# Patient Record
Sex: FEMALE | Race: AMERICAN INDIAN OR ALASKA NATIVE | NOT HISPANIC OR LATINO
[De-identification: names, ages, dates, MRNs, and addresses within clinical notes are randomized per-mention and may not be internally consistent; named-entity substitution may affect disease eponyms.]

---

## 2017-03-20 ENCOUNTER — RECORD ABSTRACTING (OUTPATIENT)
Age: 37
End: 2017-03-20

## 2017-03-20 DIAGNOSIS — Z78.9 OTHER SPECIFIED HEALTH STATUS: ICD-10-CM

## 2017-03-20 DIAGNOSIS — Z86.39 PERSONAL HISTORY OF OTHER ENDOCRINE, NUTRITIONAL AND METABOLIC DISEASE: ICD-10-CM

## 2017-03-20 DIAGNOSIS — Z86.2 PERSONAL HISTORY OF DISEASES OF THE BLOOD AND BLOOD-FORMING ORGANS AND CERTAIN DISORDERS INVOLVING THE IMMUNE MECHANISM: ICD-10-CM

## 2017-03-21 ENCOUNTER — APPOINTMENT (OUTPATIENT)
Dept: INTERNAL MEDICINE | Facility: CLINIC | Age: 37
End: 2017-03-21

## 2017-03-21 VITALS — DIASTOLIC BLOOD PRESSURE: 87 MMHG | SYSTOLIC BLOOD PRESSURE: 124 MMHG | HEART RATE: 105 BPM

## 2017-03-21 VITALS — BODY MASS INDEX: 35.34 KG/M2 | WEIGHT: 180 LBS | HEIGHT: 60 IN

## 2017-03-21 RX ORDER — BUDESONIDE AND FORMOTEROL FUMARATE DIHYDRATE 160; 4.5 UG/1; UG/1
160-4.5 AEROSOL RESPIRATORY (INHALATION) TWICE DAILY
Qty: 1 | Refills: 3 | Status: COMPLETED | COMMUNITY

## 2017-04-18 LAB
ALBUMIN SERPL-MCNC: 3.9 G/DL
ALBUMIN/GLOB SERPL: 1.44
ALP SERPL-CCNC: 60 IU/L
ALT SERPL-CCNC: 32 IU/L
ANION GAP SERPL CALC-SCNC: 8 MEQ/L
AST SERPL-CCNC: 31 IU/L
BASOPHILS # BLD: 0.05 TH/MM3
BASOPHILS NFR BLD: 0.7 %
BILIRUB SERPL-MCNC: 0.4 MG/DL
BUN SERPL-MCNC: 9 MG/DL
BUN/CREAT SERPL: 13.2 %
CALCIUM SERPL-MCNC: 8.8 MG/DL
CHLORIDE SERPL-SCNC: 107 MEQ/L
CHOLEST SERPL-MCNC: 203 MG/DL
CO2 SERPL-SCNC: 23 MEQ/L
CREAT SERPL-MCNC: 0.68 MG/DL
EOSINOPHIL # BLD: 0.48 TH/MM3
EOSINOPHIL NFR BLD: 6.4 %
ERYTHROCYTE [DISTWIDTH] IN BLOOD BY AUTOMATED COUNT: 18.5 %
ESTIMATED AVERGAGE GLUCOSE (NORTH): 134 MG/DL
GFR SERPL CREATININE-BSD FRML MDRD: 97
GLUCOSE SERPL-MCNC: 93 MG/DL
GRANULOCYTES # BLD: 2.66 TH/MM3
GRANULOCYTES NFR BLD: 35.2 %
HBA1C MFR BLD: 6.3 %
HCT VFR BLD AUTO: 33.8 %
HDLC SERPL-MCNC: 52 MG/DL
HDLC SERPL: 3.9
HGB BLD-MCNC: 10.2 G/DL
IMM GRANULOCYTES # BLD: 0.01 TH/MM3
IMM GRANULOCYTES NFR BLD: 0.1 %
LDLC SERPL DIRECT ASSAY-MCNC: 140 MG/DL
LYMPHOCYTES # BLD: 3.54 TH/MM3
LYMPHOCYTES NFR BLD: 46.9 %
MCH RBC QN AUTO: 22.5 PG
MCHC RBC AUTO-ENTMCNC: 30.2 G/DL
MCV RBC AUTO: 74.4 FL
MONOCYTES # BLD: 0.81 TH/MM3
MONOCYTES NFR BLD: 10.7 %
PLATELET # BLD: 358 TH/MM3
PMV BLD AUTO: 12.2 FL
POTASSIUM SERPL-SCNC: 4.4 MMOL/L
PROT SERPL-MCNC: 6.6 G/DL
RBC # BLD AUTO: 4.54 MIL/MM3
SODIUM SERPL-SCNC: 138 MEQ/L
TRIGL SERPL-MCNC: 132 MG/DL
VLDLC SERPL-MCNC: 26 MG/DL
WBC # BLD: 7.55 TH/MM3

## 2017-04-25 ENCOUNTER — APPOINTMENT (OUTPATIENT)
Dept: INTERNAL MEDICINE | Facility: CLINIC | Age: 37
End: 2017-04-25

## 2017-04-25 VITALS
SYSTOLIC BLOOD PRESSURE: 108 MMHG | WEIGHT: 179 LBS | BODY MASS INDEX: 32.94 KG/M2 | HEART RATE: 85 BPM | DIASTOLIC BLOOD PRESSURE: 75 MMHG | HEIGHT: 62 IN

## 2017-05-09 ENCOUNTER — APPOINTMENT (OUTPATIENT)
Dept: OBGYN | Facility: CLINIC | Age: 37
End: 2017-05-09

## 2017-05-09 ENCOUNTER — RESULT CHARGE (OUTPATIENT)
Age: 37
End: 2017-05-09

## 2017-05-09 ENCOUNTER — OUTPATIENT (OUTPATIENT)
Dept: OUTPATIENT SERVICES | Facility: HOSPITAL | Age: 37
LOS: 1 days | Discharge: HOME | End: 2017-05-09

## 2017-05-09 VITALS
BODY MASS INDEX: 35.08 KG/M2 | DIASTOLIC BLOOD PRESSURE: 62 MMHG | WEIGHT: 174 LBS | HEIGHT: 59 IN | SYSTOLIC BLOOD PRESSURE: 110 MMHG

## 2017-05-09 DIAGNOSIS — Z83.3 FAMILY HISTORY OF DIABETES MELLITUS: ICD-10-CM

## 2017-05-09 LAB — HCG UR QL: NEGATIVE

## 2017-05-09 RX ORDER — PREDNISONE 20 MG/1
20 TABLET ORAL DAILY
Qty: 5 | Refills: 0 | Status: COMPLETED | COMMUNITY
Start: 2017-03-21 | End: 2017-05-09

## 2017-05-10 ENCOUNTER — RESULT REVIEW (OUTPATIENT)
Age: 37
End: 2017-05-10

## 2017-05-19 LAB
HPV I/H RISK 1 DNA CVX QL PROBE+SIG AMP: NOT DETECTED
HPV LOW RISK DNA CVX QL PROBE+SIG AMP: NOT DETECTED

## 2017-05-23 ENCOUNTER — OUTPATIENT (OUTPATIENT)
Dept: OUTPATIENT SERVICES | Facility: HOSPITAL | Age: 37
LOS: 1 days | Discharge: HOME | End: 2017-05-23

## 2017-05-23 ENCOUNTER — APPOINTMENT (OUTPATIENT)
Dept: OBGYN | Facility: CLINIC | Age: 37
End: 2017-05-23

## 2017-06-28 DIAGNOSIS — N93.9 ABNORMAL UTERINE AND VAGINAL BLEEDING, UNSPECIFIED: ICD-10-CM

## 2017-07-03 ENCOUNTER — APPOINTMENT (OUTPATIENT)
Dept: OBGYN | Facility: CLINIC | Age: 37
End: 2017-07-03

## 2017-07-25 ENCOUNTER — APPOINTMENT (OUTPATIENT)
Dept: INTERNAL MEDICINE | Facility: CLINIC | Age: 37
End: 2017-07-25

## 2017-08-09 DIAGNOSIS — Z01.411 ENCOUNTER FOR GYNECOLOGICAL EXAMINATION (GENERAL) (ROUTINE) WITH ABNORMAL FINDINGS: ICD-10-CM

## 2017-08-09 DIAGNOSIS — N93.9 ABNORMAL UTERINE AND VAGINAL BLEEDING, UNSPECIFIED: ICD-10-CM

## 2017-08-23 ENCOUNTER — APPOINTMENT (OUTPATIENT)
Dept: OBGYN | Facility: CLINIC | Age: 37
End: 2017-08-23

## 2018-02-06 ENCOUNTER — APPOINTMENT (OUTPATIENT)
Dept: INTERNAL MEDICINE | Facility: CLINIC | Age: 38
End: 2018-02-06

## 2018-02-06 ENCOUNTER — OUTPATIENT (OUTPATIENT)
Dept: OUTPATIENT SERVICES | Facility: HOSPITAL | Age: 38
LOS: 1 days | Discharge: HOME | End: 2018-02-06

## 2018-02-06 VITALS
SYSTOLIC BLOOD PRESSURE: 119 MMHG | DIASTOLIC BLOOD PRESSURE: 78 MMHG | BODY MASS INDEX: 34.68 KG/M2 | HEIGHT: 59 IN | WEIGHT: 172 LBS | TEMPERATURE: 98.7 F | HEART RATE: 91 BPM

## 2018-02-06 RX ORDER — FEXOFENADINE HCL 180 MG
180 TABLET ORAL
Refills: 0 | Status: DISCONTINUED | COMMUNITY
End: 2018-02-06

## 2018-02-06 RX ORDER — FERROUS SULFATE 325(65) MG
325 (65 FE) TABLET ORAL TWICE DAILY
Qty: 60 | Refills: 2 | Status: DISCONTINUED | COMMUNITY
Start: 2017-04-25 | End: 2018-02-06

## 2018-02-06 RX ORDER — CHLORHEXIDINE GLUCONATE 4 %
325 (65 FE) LIQUID (ML) TOPICAL
Refills: 0 | Status: DISCONTINUED | COMMUNITY
End: 2018-02-06

## 2018-02-06 RX ORDER — ALBUTEROL SULFATE 90 UG/1
108 (90 BASE) AEROSOL, METERED RESPIRATORY (INHALATION)
Qty: 1 | Refills: 3 | Status: DISCONTINUED | COMMUNITY
Start: 2017-03-21 | End: 2018-02-06

## 2018-02-06 RX ORDER — NORETHINDRONE AND ETHINYL ESTRADIOL 1 MG-35MCG
1-35 KIT ORAL
Qty: 28 | Refills: 2 | Status: DISCONTINUED | COMMUNITY
Start: 2017-05-23 | End: 2018-02-06

## 2018-02-06 RX ORDER — MONTELUKAST 10 MG/1
10 TABLET, FILM COATED ORAL
Qty: 30 | Refills: 1 | Status: DISCONTINUED | COMMUNITY
End: 2018-02-06

## 2018-02-07 DIAGNOSIS — E66.9 OBESITY, UNSPECIFIED: ICD-10-CM

## 2018-02-07 DIAGNOSIS — Z02.1 ENCOUNTER FOR PRE-EMPLOYMENT EXAMINATION: ICD-10-CM

## 2018-02-07 DIAGNOSIS — J45.909 UNSPECIFIED ASTHMA, UNCOMPLICATED: ICD-10-CM

## 2018-02-07 DIAGNOSIS — R73.03 PREDIABETES: ICD-10-CM

## 2018-02-07 DIAGNOSIS — Z23 ENCOUNTER FOR IMMUNIZATION: ICD-10-CM

## 2018-02-15 ENCOUNTER — LABORATORY RESULT (OUTPATIENT)
Age: 38
End: 2018-02-15

## 2018-02-20 ENCOUNTER — APPOINTMENT (OUTPATIENT)
Dept: INTERNAL MEDICINE | Facility: CLINIC | Age: 38
End: 2018-02-20

## 2018-02-20 ENCOUNTER — OUTPATIENT (OUTPATIENT)
Dept: OUTPATIENT SERVICES | Facility: HOSPITAL | Age: 38
LOS: 1 days | Discharge: HOME | End: 2018-02-20

## 2018-02-20 VITALS
BODY MASS INDEX: 34.47 KG/M2 | DIASTOLIC BLOOD PRESSURE: 76 MMHG | SYSTOLIC BLOOD PRESSURE: 116 MMHG | HEIGHT: 59 IN | WEIGHT: 171 LBS | HEART RATE: 91 BPM

## 2018-02-20 DIAGNOSIS — Z23 ENCOUNTER FOR IMMUNIZATION: ICD-10-CM

## 2018-02-20 DIAGNOSIS — Z02.1 ENCOUNTER FOR PRE-EMPLOYMENT EXAMINATION: ICD-10-CM

## 2018-02-20 LAB
ADJUSTED MITOGEN: >10 IU/ML
ADJUSTED TB AG: 0.02 IU/ML
ALBUMIN SERPL ELPH-MCNC: 3.9 G/DL
ALP BLD-CCNC: 48 U/L
ALT SERPL-CCNC: 24 U/L
ANION GAP SERPL CALC-SCNC: 10 MMOL/L
AST SERPL-CCNC: 27 U/L
BILIRUB SERPL-MCNC: 0.6 MG/DL
BUN SERPL-MCNC: 6 MG/DL
CALCIUM SERPL-MCNC: 8.7 MG/DL
CHLORIDE SERPL-SCNC: 106 MMOL/L
CHOLEST SERPL-MCNC: 151 MG/DL
CHOLEST/HDLC SERPL: 4.7 RATIO
CO2 SERPL-SCNC: 22 MMOL/L
CREAT SERPL-MCNC: 0.6 MG/DL
GLUCOSE SERPL-MCNC: 93 MG/DL
HBV SURFACE AB SER QL: NONREACTIVE
HDLC SERPL-MCNC: 32 MG/DL
LDLC SERPL CALC-MCNC: 107 MG/DL
M TB IFN-G BLD-IMP: NEGATIVE
MEV IGG FLD QL IA: >300 AU/ML
MEV IGG+IGM SER-IMP: POSITIVE
MUV AB SER-ACNC: POSITIVE
MUV IGG SER QL IA: 86.6 AU/ML
POTASSIUM SERPL-SCNC: 4.5 MMOL/L
PROT SERPL-MCNC: 6.7 G/DL
QUANTIFERON GOLD NIL: 0.04 IU/ML
RUBV IGG FLD-ACNC: 12.4 INDEX
RUBV IGG SER-IMP: POSITIVE
SODIUM SERPL-SCNC: 138 MMOL/L
TRIGL SERPL-MCNC: 133 MG/DL
VZV AB TITR SER: POSITIVE
VZV IGG SER IF-ACNC: 720.2 INDEX

## 2018-02-20 RX ORDER — ALBUTEROL 90 MCG
AEROSOL (GRAM) INHALATION
Refills: 0 | Status: DISCONTINUED | COMMUNITY
End: 2018-02-20

## 2018-03-27 ENCOUNTER — OUTPATIENT (OUTPATIENT)
Dept: OUTPATIENT SERVICES | Facility: HOSPITAL | Age: 38
LOS: 1 days | Discharge: HOME | End: 2018-03-27

## 2018-03-27 ENCOUNTER — APPOINTMENT (OUTPATIENT)
Dept: INTERNAL MEDICINE | Facility: CLINIC | Age: 38
End: 2018-03-27

## 2018-03-27 VITALS
BODY MASS INDEX: 33.87 KG/M2 | WEIGHT: 168 LBS | SYSTOLIC BLOOD PRESSURE: 122 MMHG | HEIGHT: 59 IN | DIASTOLIC BLOOD PRESSURE: 86 MMHG | HEART RATE: 89 BPM

## 2018-03-27 DIAGNOSIS — E66.9 OBESITY, UNSPECIFIED: ICD-10-CM

## 2018-03-27 DIAGNOSIS — B19.10 UNSPECIFIED VIRAL HEPATITIS B W/OUT HEPATIC COMA: ICD-10-CM

## 2018-07-24 ENCOUNTER — APPOINTMENT (OUTPATIENT)
Dept: INTERNAL MEDICINE | Facility: CLINIC | Age: 38
End: 2018-07-24

## 2018-08-28 ENCOUNTER — APPOINTMENT (OUTPATIENT)
Dept: INTERNAL MEDICINE | Facility: CLINIC | Age: 38
End: 2018-08-28

## 2018-09-22 ENCOUNTER — RX RENEWAL (OUTPATIENT)
Age: 38
End: 2018-09-22

## 2019-02-01 ENCOUNTER — OUTPATIENT (OUTPATIENT)
Dept: OUTPATIENT SERVICES | Facility: HOSPITAL | Age: 39
LOS: 1 days | End: 2019-02-01
Payer: MEDICAID

## 2019-02-01 PROCEDURE — G9001: CPT

## 2019-02-15 ENCOUNTER — OUTPATIENT (OUTPATIENT)
Dept: OUTPATIENT SERVICES | Facility: HOSPITAL | Age: 39
LOS: 1 days | Discharge: HOME | End: 2019-02-15

## 2019-02-15 ENCOUNTER — APPOINTMENT (OUTPATIENT)
Dept: INTERNAL MEDICINE | Facility: CLINIC | Age: 39
End: 2019-02-15

## 2019-02-15 VITALS
HEIGHT: 59 IN | TEMPERATURE: 98.3 F | HEART RATE: 89 BPM | WEIGHT: 170 LBS | DIASTOLIC BLOOD PRESSURE: 82 MMHG | SYSTOLIC BLOOD PRESSURE: 123 MMHG | BODY MASS INDEX: 34.27 KG/M2

## 2019-02-15 NOTE — PLAN
[FreeTextEntry1] : #Elevated HGA1c\par - recommended diet and weight control\par - dropped from previous 6.3 to 5.9 \par \par #Iron Def anemia\par - CBC ordered and followup \par \par #HCM \par - Pap smear needed \par - Follow up OB/Gyn \par

## 2019-02-15 NOTE — HISTORY OF PRESENT ILLNESS
[FreeTextEntry1] : Follow up  [de-identified] : 38 yr female PMHx of asthma microcystic anemia, menorrhgia, asthma. Presents for follow up blood work and work paperwork. On blood work pt found to have elevated HGA1c. No CBC was done with last blood work. Pt was given OCP's six months since last visit to Gyn physician and was lost to follow-up. Currently asymmptomatic, no blurry vision or heaches.\par

## 2019-02-15 NOTE — PHYSICAL EXAM
[No Acute Distress] : no acute distress [Well Nourished] : well nourished [Well Developed] : well developed [Well-Appearing] : well-appearing [PERRL] : pupils equal round and reactive to light [Normal Outer Ear/Nose] : the outer ears and nose were normal in appearance [Normal Oropharynx] : the oropharynx was normal [No JVD] : no jugular venous distention [No Lymphadenopathy] : no lymphadenopathy [No Respiratory Distress] : no respiratory distress  [Clear to Auscultation] : lungs were clear to auscultation bilaterally [No Accessory Muscle Use] : no accessory muscle use [Normal Rate] : normal rate  [Regular Rhythm] : with a regular rhythm [Normal S1, S2] : normal S1 and S2 [Normal Posterior Cervical Nodes] : no posterior cervical lymphadenopathy [Normal Anterior Cervical Nodes] : no anterior cervical lymphadenopathy [No CVA Tenderness] : no CVA  tenderness [No Joint Swelling] : no joint swelling [Grossly Normal Strength/Tone] : grossly normal strength/tone [Normal Gait] : normal gait [Coordination Grossly Intact] : coordination grossly intact [No Focal Deficits] : no focal deficits

## 2019-02-15 NOTE — ASSESSMENT
[FreeTextEntry1] : 38 yr female PMHx of asthma microcystic anemia, menorrhgia, asthma. Presents for follow up blood work and work paperwork. On blood work pt found to have elevated HGA1c

## 2019-02-22 DIAGNOSIS — D50.9 IRON DEFICIENCY ANEMIA, UNSPECIFIED: ICD-10-CM

## 2019-02-22 DIAGNOSIS — Z71.89 OTHER SPECIFIED COUNSELING: ICD-10-CM

## 2019-02-22 DIAGNOSIS — J45.909 UNSPECIFIED ASTHMA, UNCOMPLICATED: ICD-10-CM

## 2019-03-30 ENCOUNTER — LABORATORY RESULT (OUTPATIENT)
Age: 39
End: 2019-03-30

## 2019-04-02 LAB
ANION GAP SERPL CALC-SCNC: 14 MMOL/L
BASOPHILS # BLD AUTO: 0.06 K/UL
BASOPHILS NFR BLD AUTO: 1 %
BUN SERPL-MCNC: 9 MG/DL
CALCIUM SERPL-MCNC: 9.5 MG/DL
CHLORIDE SERPL-SCNC: 106 MMOL/L
CHOLEST SERPL-MCNC: 166 MG/DL
CHOLEST/HDLC SERPL: 3.7 RATIO
CO2 SERPL-SCNC: 22 MMOL/L
CREAT SERPL-MCNC: 0.7 MG/DL
EOSINOPHIL # BLD AUTO: 0.17 K/UL
EOSINOPHIL NFR BLD AUTO: 2.8 %
GLUCOSE SERPL-MCNC: 97 MG/DL
HCT VFR BLD CALC: 29.7 %
HDLC SERPL-MCNC: 45 MG/DL
HGB BLD-MCNC: 8.2 G/DL
IMM GRANULOCYTES NFR BLD AUTO: 0.3 %
LDLC SERPL CALC-MCNC: 121 MG/DL
LYMPHOCYTES # BLD AUTO: 2.31 K/UL
LYMPHOCYTES NFR BLD AUTO: 37.9 %
MAN DIFF?: NORMAL
MCHC RBC-ENTMCNC: 18.2 PG
MCHC RBC-ENTMCNC: 27.6 G/DL
MCV RBC AUTO: 65.9 FL
MONOCYTES # BLD AUTO: 0.46 K/UL
MONOCYTES NFR BLD AUTO: 7.5 %
NEUTROPHILS # BLD AUTO: 3.08 K/UL
NEUTROPHILS NFR BLD AUTO: 50.5 %
PLATELET # BLD AUTO: 439 K/UL
POTASSIUM SERPL-SCNC: 4.8 MMOL/L
RBC # BLD: 4.51 M/UL
RBC # FLD: 20.7 %
SODIUM SERPL-SCNC: 142 MMOL/L
T3 SERPL-MCNC: 121 NG/DL
T3FREE SERPL-MCNC: 2.91 PG/ML
T4 FREE SERPL-MCNC: 1.1 NG/DL
TRIGL SERPL-MCNC: 101 MG/DL
TSH SERPL-ACNC: 1.09 UIU/ML
WBC # FLD AUTO: 6.1 K/UL

## 2019-04-04 LAB
M TB IFN-G BLD-IMP: NEGATIVE
QUANTIFERON TB PLUS MITOGEN MINUS NIL: >10 IU/ML
QUANTIFERON TB PLUS NIL: 0.02 IU/ML
QUANTIFERON TB PLUS TB1 MINUS NIL: 0.01 IU/ML
QUANTIFERON TB PLUS TB2 MINUS NIL: 0 IU/ML

## 2019-04-06 ENCOUNTER — LABORATORY RESULT (OUTPATIENT)
Age: 39
End: 2019-04-06

## 2019-05-22 ENCOUNTER — APPOINTMENT (OUTPATIENT)
Dept: OBGYN | Facility: CLINIC | Age: 39
End: 2019-05-22

## 2019-08-20 ENCOUNTER — APPOINTMENT (OUTPATIENT)
Dept: INTERNAL MEDICINE | Facility: CLINIC | Age: 39
End: 2019-08-20

## 2019-08-28 ENCOUNTER — APPOINTMENT (OUTPATIENT)
Dept: OBGYN | Facility: CLINIC | Age: 39
End: 2019-08-28

## 2019-10-16 ENCOUNTER — APPOINTMENT (OUTPATIENT)
Dept: OBGYN | Facility: CLINIC | Age: 39
End: 2019-10-16

## 2019-11-26 ENCOUNTER — APPOINTMENT (OUTPATIENT)
Dept: OBGYN | Facility: CLINIC | Age: 39
End: 2019-11-26
Payer: MEDICAID

## 2019-11-26 ENCOUNTER — OUTPATIENT (OUTPATIENT)
Dept: OUTPATIENT SERVICES | Facility: HOSPITAL | Age: 39
LOS: 1 days | Discharge: HOME | End: 2019-11-26

## 2019-11-26 VITALS
DIASTOLIC BLOOD PRESSURE: 88 MMHG | BODY MASS INDEX: 34.48 KG/M2 | SYSTOLIC BLOOD PRESSURE: 118 MMHG | HEIGHT: 59 IN | WEIGHT: 171.06 LBS

## 2019-11-26 DIAGNOSIS — Z01.419 ENCOUNTER FOR GYNECOLOGICAL EXAMINATION (GENERAL) (ROUTINE) W/OUT ABNORMAL FINDINGS: ICD-10-CM

## 2019-11-26 DIAGNOSIS — Z01.419 ENCOUNTER FOR GYNECOLOGICAL EXAMINATION (GENERAL) (ROUTINE) WITHOUT ABNORMAL FINDINGS: ICD-10-CM

## 2019-11-26 DIAGNOSIS — T83.32XA DISPLACEMENT OF INTRAUTERINE CONTRACEPTIVE DEVICE, INITIAL ENCOUNTER: ICD-10-CM

## 2019-11-26 DIAGNOSIS — N92.0 EXCESSIVE AND FREQUENT MENSTRUATION WITH REGULAR CYCLE: ICD-10-CM

## 2019-11-26 DIAGNOSIS — Z87.09 PERSONAL HISTORY OF OTHER DISEASES OF THE RESPIRATORY SYSTEM: ICD-10-CM

## 2019-11-26 DIAGNOSIS — N83.209 UNSPECIFIED OVARIAN CYST, UNSPECIFIED SIDE: ICD-10-CM

## 2019-11-26 PROCEDURE — ZZZZZ: CPT

## 2019-11-26 NOTE — HISTORY OF PRESENT ILLNESS
[Reproductive Age] : is of reproductive age [Last Pap ___] : Last cervical pap smear was [unfilled] [Menstrual Problems] : reports abnormal menses [Abnormal Duration ___ days] : the duration was abnormal lasting [unfilled] days [Excessive Bleeding] : bleeding has been excessive [Irregular Cycle Intervals] : are  irregular [Irregular Menses] : irregular menses [Prolonged Menses] : prolonged menses [Heavy Bleeding] : described as heavy in severity [Pain] : pelvic pain [Sexually Active] : is sexually active [Male ___] : [unfilled] male [Monogamous] : is monogamous [de-identified] : 5/2017 [Fever] : no fever [Vomiting] : no vomiting [Diarrhea] : no diarrhea [Nausea] : no nausea [Pelvic Pressure] : no pelvic pressure [Vaginal Bleeding] : no vaginal bleeding [Dysuria] : no dysuria [Palpitations] : no palpitations [Dizziness] : no dizziness [Menopausal Symptoms] : no manopausal symptoms [Pregnancy] : no pregnancy [FreeTextEntry8] : x 5-6 yrs

## 2019-11-26 NOTE — PHYSICAL EXAM
[Alert] : alert [Awake] : awake [Soft] : soft [Oriented x3] : oriented to person, place, and time [Normal] : uterus [Labia Majora] : labia major [Labia Minora] : labia minora [No Bleeding] : there was no active vaginal bleeding [IUD String] : had an IUD string protruding out [Normal Position] : in a normal position [Uterine Adnexae] : were not tender and not enlarged [Acute Distress] : no acute distress [Nipple Discharge] : no nipple discharge [Mass] : no breast mass [Axillary LAD] : no axillary lymphadenopathy [Tender] : non tender [Discharge] : had no discharge [Motion Tenderness] : there was no cervical motion tenderness [Pap Obtained] : a Pap smear was not performed [Enlarged ___ wks] : not enlarged [Adnexa Tenderness] : were not tender [Tenderness] : nontender [Ovarian Mass (___ Cm)] : there were no adnexal masses

## 2019-11-30 ENCOUNTER — OUTPATIENT (OUTPATIENT)
Dept: OUTPATIENT SERVICES | Facility: HOSPITAL | Age: 39
LOS: 1 days | Discharge: HOME | End: 2019-11-30
Payer: MEDICAID

## 2019-11-30 DIAGNOSIS — Z12.31 ENCOUNTER FOR SCREENING MAMMOGRAM FOR MALIGNANT NEOPLASM OF BREAST: ICD-10-CM

## 2019-11-30 PROCEDURE — 77067 SCR MAMMO BI INCL CAD: CPT | Mod: 26

## 2019-11-30 PROCEDURE — 77063 BREAST TOMOSYNTHESIS BI: CPT | Mod: 26

## 2019-12-18 ENCOUNTER — ASOB RESULT (OUTPATIENT)
Age: 39
End: 2019-12-18

## 2019-12-18 ENCOUNTER — APPOINTMENT (OUTPATIENT)
Dept: ANTEPARTUM | Facility: CLINIC | Age: 39
End: 2019-12-18
Payer: MEDICAID

## 2019-12-18 PROCEDURE — 76830 TRANSVAGINAL US NON-OB: CPT | Mod: 26

## 2019-12-24 ENCOUNTER — OUTPATIENT (OUTPATIENT)
Dept: OUTPATIENT SERVICES | Facility: HOSPITAL | Age: 39
LOS: 1 days | Discharge: HOME | End: 2019-12-24

## 2019-12-24 ENCOUNTER — RESULT CHARGE (OUTPATIENT)
Age: 39
End: 2019-12-24

## 2019-12-24 ENCOUNTER — APPOINTMENT (OUTPATIENT)
Dept: OBGYN | Facility: CLINIC | Age: 39
End: 2019-12-24
Payer: MEDICAID

## 2019-12-24 ENCOUNTER — LABORATORY RESULT (OUTPATIENT)
Age: 39
End: 2019-12-24

## 2019-12-24 DIAGNOSIS — T83.32XA DISPLACEMENT OF INTRAUTERINE CONTRACEPTIVE DEVICE, INITIAL ENCOUNTER: ICD-10-CM

## 2019-12-24 DIAGNOSIS — N93.9 ABNORMAL UTERINE AND VAGINAL BLEEDING, UNSPECIFIED: ICD-10-CM

## 2019-12-24 PROCEDURE — 99213 OFFICE O/P EST LOW 20 MIN: CPT | Mod: 25

## 2019-12-24 PROCEDURE — 58301 REMOVE INTRAUTERINE DEVICE: CPT

## 2019-12-24 NOTE — END OF VISIT
[] : Resident [Resident] : Resident [FreeTextEntry3] : Patient with persistent AUB and obesity, with paragard IUD in place but seen on sonogram within cervical os. Abnormal bleeding could be due to paragard. Paragard removed and EMB performed. Will place Mirena IUD after pathology from biopsy results.

## 2019-12-24 NOTE — PHYSICAL EXAM
[Labia Minora] : labia minora [Labia Majora] : labia major [Normal] : clitoris [No Bleeding] : there was no active vaginal bleeding [Anteversion] : anteverted [Enlarged ___ wks] : enlarged [unfilled] ~Uweeks [Uterine Adnexae] : were not tender and not enlarged

## 2019-12-24 NOTE — PROCEDURE
[Endometrial Biopsy] : Endometrial biopsy [Irregular Bleeding] : irregular uterine bleeding [Uterine Perforation] : uterine perforation [Neg Pregnancy Test] : a pregnancy test was negative [CONSENT OBTAINED] : written consent was obtained prior to the procedure. [LMP ___] : LMP was [unfilled] [No Premedication] : No premedication [None] : none [Anteverted] : anteverted [Pipelle] : a Pipelle endometrial suction curette [Abundant] : an abundant [Sent to Histology] : the specimen was place in buffered formalin and sent for pathlogy [Paraguard] : Alban [IUD Removal] : IUD [ IUD] :  IUD [Risks] : risks [Patient] : patient [Benefits] : benefits [Alternatives] : alternatives [Speculum Placed] : a speculum was placed in the vagina [Strings Visualized] : the IUD strings were visualized [IUD Discarded] : discarded [IUD Removed - Forceps] : the strings were grasped with forceps and the IUD was removed [Heavy Vaginal Bleeding] : for heavy vaginal bleeding [No Complications] : none [Tolerated Well] : the patient tolerated the procedure well [Pelvic Pain] : for pelvic pain [de-identified] : malposition

## 2019-12-26 DIAGNOSIS — N93.9 ABNORMAL UTERINE AND VAGINAL BLEEDING, UNSPECIFIED: ICD-10-CM

## 2019-12-26 DIAGNOSIS — T83.32XA DISPLACEMENT OF INTRAUTERINE CONTRACEPTIVE DEVICE, INITIAL ENCOUNTER: ICD-10-CM

## 2019-12-26 LAB — HCG UR QL: NEGATIVE

## 2019-12-30 ENCOUNTER — RESULT REVIEW (OUTPATIENT)
Age: 39
End: 2019-12-30

## 2020-01-14 ENCOUNTER — APPOINTMENT (OUTPATIENT)
Dept: OBGYN | Facility: CLINIC | Age: 40
End: 2020-01-14

## 2020-03-10 ENCOUNTER — APPOINTMENT (OUTPATIENT)
Dept: OBGYN | Facility: CLINIC | Age: 40
End: 2020-03-10
Payer: MEDICAID

## 2020-03-10 ENCOUNTER — RESULT CHARGE (OUTPATIENT)
Age: 40
End: 2020-03-10

## 2020-03-10 ENCOUNTER — OUTPATIENT (OUTPATIENT)
Dept: OUTPATIENT SERVICES | Facility: HOSPITAL | Age: 40
LOS: 1 days | Discharge: HOME | End: 2020-03-10

## 2020-03-10 VITALS
DIASTOLIC BLOOD PRESSURE: 76 MMHG | BODY MASS INDEX: 33.26 KG/M2 | WEIGHT: 165 LBS | SYSTOLIC BLOOD PRESSURE: 112 MMHG | HEIGHT: 59 IN

## 2020-03-10 DIAGNOSIS — Z30.430 ENCOUNTER FOR INSERTION OF INTRAUTERINE CONTRACEPTIVE DEVICE: ICD-10-CM

## 2020-03-10 PROCEDURE — 99212 OFFICE O/P EST SF 10 MIN: CPT | Mod: 25

## 2020-03-10 PROCEDURE — 58300 INSERT INTRAUTERINE DEVICE: CPT

## 2020-03-10 NOTE — PROCEDURE
[IUD Placement] : intrauterine device (IUD) placement [Prevention of Pregnancy] : prevention of pregnancy [Risks] : risks [Benefits] : benefits [Patient] : patient [Infection] : infection [Bleeding] : bleeding [Pain] : pain [Expulsion] : expulsion [Failure] : failure [Uterine Perforation] : uterine perforation [CONSENT OBTAINED] : written consent was obtained prior to the procedure. [LMP ___] : LMP was [unfilled] [Neg Pregnancy Test] : a pregnancy test was negative [No Premedication] : No premedication [Betadine] : Prepped with Betadine [None] : none [Tenaculum] : a single toothed tenaculum [Easy Passage] : allowed easy passage of a uterine sound without dilation [Mirena IUD] : The Mirena IUD was inserted past the internal cervical os. The IUD was then gently inserted upwards toward the fundus.  The IUD strings were cut to an appropriate length. [Tolerated Well] : the patient tolerated the procedure well [No Complications] : there were no complications [Motrin/Ibuprofen] : Motrin/Ibuprofen

## 2020-03-10 NOTE — PHYSICAL EXAM
[No Lesions] : no genitalia lesions [Normal] : uterus [No Bleeding] : there was no active vaginal bleeding [Discharge] : had no discharge [Motion Tenderness] : there was no cervical motion tenderness [Normal Position] : in a normal position [Tenderness] : nontender [Uterine Adnexae] : were not tender and not enlarged [Adnexa Tenderness] : were not tender

## 2020-03-11 DIAGNOSIS — Z30.430 ENCOUNTER FOR INSERTION OF INTRAUTERINE CONTRACEPTIVE DEVICE: ICD-10-CM

## 2020-03-12 LAB
C TRACH RRNA SPEC QL NAA+PROBE: NOT DETECTED
HCG UR QL: NEGATIVE
N GONORRHOEA RRNA SPEC QL NAA+PROBE: NOT DETECTED
QUALITY CONTROL: YES
SOURCE AMPLIFICATION: NORMAL

## 2020-04-07 ENCOUNTER — APPOINTMENT (OUTPATIENT)
Dept: OBGYN | Facility: CLINIC | Age: 40
End: 2020-04-07

## 2020-05-12 ENCOUNTER — APPOINTMENT (OUTPATIENT)
Dept: INTERNAL MEDICINE | Facility: CLINIC | Age: 40
End: 2020-05-12

## 2020-05-12 ENCOUNTER — OUTPATIENT (OUTPATIENT)
Dept: OUTPATIENT SERVICES | Facility: HOSPITAL | Age: 40
LOS: 1 days | Discharge: HOME | End: 2020-05-12

## 2020-05-12 ENCOUNTER — APPOINTMENT (OUTPATIENT)
Dept: INTERNAL MEDICINE | Facility: CLINIC | Age: 40
End: 2020-05-12
Payer: MEDICAID

## 2020-05-12 DIAGNOSIS — D50.9 IRON DEFICIENCY ANEMIA, UNSPECIFIED: ICD-10-CM

## 2020-05-12 DIAGNOSIS — R73.9 HYPERGLYCEMIA, UNSPECIFIED: ICD-10-CM

## 2020-05-12 DIAGNOSIS — E78.5 HYPERLIPIDEMIA, UNSPECIFIED: ICD-10-CM

## 2020-05-12 PROCEDURE — 99212 OFFICE O/P EST SF 10 MIN: CPT | Mod: GC,95

## 2020-05-12 NOTE — ASSESSMENT
[FreeTextEntry1] : #Elevated HGA1c\par - claims good diet compliance\par \par #Iron Def anemia\par - reordered labs\par \par f/u 3 months in person \par

## 2020-05-12 NOTE — HISTORY OF PRESENT ILLNESS
[Medical Office: ([facility])___] : at the medical office located in  [Home] : at home, [unfilled] , at the time of the visit. [Patient] : the patient [Other:____] : [unfilled] [Self] : self [FreeTextEntry1] : refills [de-identified] : p/w needs refills for inhalers\par denies SOB or cough\par

## 2020-06-24 ENCOUNTER — APPOINTMENT (OUTPATIENT)
Dept: OBGYN | Facility: CLINIC | Age: 40
End: 2020-06-24
Payer: MEDICAID

## 2020-06-24 ENCOUNTER — OUTPATIENT (OUTPATIENT)
Dept: OUTPATIENT SERVICES | Facility: HOSPITAL | Age: 40
LOS: 1 days | Discharge: HOME | End: 2020-06-24

## 2020-06-24 VITALS
SYSTOLIC BLOOD PRESSURE: 114 MMHG | HEIGHT: 59 IN | BODY MASS INDEX: 33.87 KG/M2 | WEIGHT: 168 LBS | DIASTOLIC BLOOD PRESSURE: 78 MMHG

## 2020-06-24 DIAGNOSIS — R10.2 PELVIC AND PERINEAL PAIN: ICD-10-CM

## 2020-06-24 DIAGNOSIS — Z97.5 PRESENCE OF (INTRAUTERINE) CONTRACEPTIVE DEVICE: ICD-10-CM

## 2020-06-24 DIAGNOSIS — N92.6 IRREGULAR MENSTRUATION, UNSPECIFIED: ICD-10-CM

## 2020-06-24 PROCEDURE — 99213 OFFICE O/P EST LOW 20 MIN: CPT

## 2020-06-24 NOTE — PHYSICAL EXAM
[Labia Majora] : labia major [Labia Minora] : labia minora [Normal] : clitoris [Discharge] : had a ~M discharge [IUD String] : had an IUD string protruding out [Soft] :  the cervix was soft [Erosion] : had no erosions [Barrel-Shaped] : was not barrel-shaped [Polyp ___ cm] : had no polyp [Pap Obtained] : a Pap smear was not performed [Motion Tenderness] : there was no cervical motion tenderness [Dilated] : the cervix was not dilated [FreeTextEntry5] : Aptima swab obtained

## 2021-06-11 ENCOUNTER — LABORATORY RESULT (OUTPATIENT)
Age: 41
End: 2021-06-11

## 2023-12-04 ENCOUNTER — APPOINTMENT (OUTPATIENT)
Dept: INTERNAL MEDICINE | Facility: CLINIC | Age: 43
End: 2023-12-04
Payer: MEDICAID

## 2023-12-04 ENCOUNTER — OUTPATIENT (OUTPATIENT)
Dept: OUTPATIENT SERVICES | Facility: HOSPITAL | Age: 43
LOS: 1 days | End: 2023-12-04
Payer: MEDICAID

## 2023-12-04 VITALS
HEART RATE: 77 BPM | WEIGHT: 171 LBS | DIASTOLIC BLOOD PRESSURE: 89 MMHG | OXYGEN SATURATION: 96 % | TEMPERATURE: 98 F | BODY MASS INDEX: 34.47 KG/M2 | HEIGHT: 59 IN | SYSTOLIC BLOOD PRESSURE: 134 MMHG

## 2023-12-04 DIAGNOSIS — Z23 ENCOUNTER FOR IMMUNIZATION: ICD-10-CM

## 2023-12-04 DIAGNOSIS — Z00.00 ENCOUNTER FOR GENERAL ADULT MEDICAL EXAMINATION W/OUT ABNORMAL FINDINGS: ICD-10-CM

## 2023-12-04 DIAGNOSIS — Z00.00 ENCOUNTER FOR GENERAL ADULT MEDICAL EXAMINATION WITHOUT ABNORMAL FINDINGS: ICD-10-CM

## 2023-12-04 PROCEDURE — 90471 IMMUNIZATION ADMIN: CPT | Mod: 25

## 2023-12-04 PROCEDURE — 99204 OFFICE O/P NEW MOD 45 MIN: CPT

## 2023-12-04 PROCEDURE — 90686 IIV4 VACC NO PRSV 0.5 ML IM: CPT

## 2023-12-04 RX ORDER — ALBUTEROL SULFATE 90 UG/1
108 (90 BASE) AEROSOL, METERED RESPIRATORY (INHALATION) 3 TIMES DAILY
Qty: 1 | Refills: 1 | Status: COMPLETED | COMMUNITY
Start: 2018-02-06 | End: 2023-12-04

## 2023-12-04 RX ORDER — BUDESONIDE AND FORMOTEROL FUMARATE DIHYDRATE 160; 4.5 UG/1; UG/1
160-4.5 AEROSOL RESPIRATORY (INHALATION) TWICE DAILY
Qty: 1 | Refills: 1 | Status: COMPLETED | COMMUNITY
Start: 2017-03-21 | End: 2023-12-04

## 2023-12-05 DIAGNOSIS — Z23 ENCOUNTER FOR IMMUNIZATION: ICD-10-CM

## 2023-12-05 DIAGNOSIS — Z00.00 ENCOUNTER FOR GENERAL ADULT MEDICAL EXAMINATION WITHOUT ABNORMAL FINDINGS: ICD-10-CM

## 2023-12-05 DIAGNOSIS — J45.909 UNSPECIFIED ASTHMA, UNCOMPLICATED: ICD-10-CM

## 2023-12-15 ENCOUNTER — OUTPATIENT (OUTPATIENT)
Dept: OUTPATIENT SERVICES | Facility: HOSPITAL | Age: 43
LOS: 1 days | End: 2023-12-15
Payer: MEDICAID

## 2023-12-15 DIAGNOSIS — Z00.00 ENCOUNTER FOR GENERAL ADULT MEDICAL EXAMINATION WITHOUT ABNORMAL FINDINGS: ICD-10-CM

## 2023-12-15 PROCEDURE — 84443 ASSAY THYROID STIM HORMONE: CPT

## 2023-12-15 PROCEDURE — 85027 COMPLETE CBC AUTOMATED: CPT

## 2023-12-15 PROCEDURE — 83036 HEMOGLOBIN GLYCOSYLATED A1C: CPT

## 2023-12-15 PROCEDURE — 36415 COLL VENOUS BLD VENIPUNCTURE: CPT

## 2023-12-15 PROCEDURE — 80053 COMPREHEN METABOLIC PANEL: CPT

## 2023-12-15 PROCEDURE — 80061 LIPID PANEL: CPT

## 2023-12-16 DIAGNOSIS — Z00.00 ENCOUNTER FOR GENERAL ADULT MEDICAL EXAMINATION WITHOUT ABNORMAL FINDINGS: ICD-10-CM

## 2023-12-18 LAB
ALBUMIN SERPL ELPH-MCNC: 4.9 G/DL
ALP BLD-CCNC: 66 U/L
ALT SERPL-CCNC: 43 U/L
ANION GAP SERPL CALC-SCNC: 13 MMOL/L
AST SERPL-CCNC: 31 U/L
BASOPHILS # BLD AUTO: 0.06 K/UL
BASOPHILS NFR BLD AUTO: 0.9 %
BILIRUB SERPL-MCNC: 0.6 MG/DL
BUN SERPL-MCNC: 12 MG/DL
CALCIUM SERPL-MCNC: 10.4 MG/DL
CHLORIDE SERPL-SCNC: 106 MMOL/L
CHOLEST SERPL-MCNC: 185 MG/DL
CO2 SERPL-SCNC: 22 MMOL/L
CREAT SERPL-MCNC: 0.7 MG/DL
EGFR: 109 ML/MIN/1.73M2
EOSINOPHIL # BLD AUTO: 0.26 K/UL
EOSINOPHIL NFR BLD AUTO: 4 %
ESTIMATED AVERAGE GLUCOSE: 186 MG/DL
GLUCOSE SERPL-MCNC: 103 MG/DL
HBA1C MFR BLD HPLC: 8.1 %
HCT VFR BLD CALC: 43 %
HDLC SERPL-MCNC: 38 MG/DL
HGB BLD-MCNC: 14.3 G/DL
IMM GRANULOCYTES NFR BLD AUTO: 0.2 %
LDLC SERPL CALC-MCNC: 126 MG/DL
LYMPHOCYTES # BLD AUTO: 2.77 K/UL
LYMPHOCYTES NFR BLD AUTO: 42.2 %
MAN DIFF?: NORMAL
MCHC RBC-ENTMCNC: 29.8 PG
MCHC RBC-ENTMCNC: 33.3 G/DL
MCV RBC AUTO: 89.6 FL
MONOCYTES # BLD AUTO: 0.49 K/UL
MONOCYTES NFR BLD AUTO: 7.5 %
NEUTROPHILS # BLD AUTO: 2.97 K/UL
NEUTROPHILS NFR BLD AUTO: 45.2 %
NONHDLC SERPL-MCNC: 147 MG/DL
PLATELET # BLD AUTO: 270 K/UL
POTASSIUM SERPL-SCNC: 4.9 MMOL/L
PROT SERPL-MCNC: 7.3 G/DL
RBC # BLD: 4.8 M/UL
RBC # FLD: 12.5 %
SODIUM SERPL-SCNC: 141 MMOL/L
TRIGL SERPL-MCNC: 104 MG/DL
TSH SERPL-ACNC: 0.65 UIU/ML
WBC # FLD AUTO: 6.56 K/UL

## 2023-12-27 ENCOUNTER — OUTPATIENT (OUTPATIENT)
Dept: OUTPATIENT SERVICES | Facility: HOSPITAL | Age: 43
LOS: 1 days | End: 2023-12-27
Payer: MEDICAID

## 2023-12-27 ENCOUNTER — APPOINTMENT (OUTPATIENT)
Dept: INTERNAL MEDICINE | Facility: CLINIC | Age: 43
End: 2023-12-27
Payer: MEDICAID

## 2023-12-27 DIAGNOSIS — Z00.00 ENCOUNTER FOR GENERAL ADULT MEDICAL EXAMINATION WITHOUT ABNORMAL FINDINGS: ICD-10-CM

## 2023-12-27 DIAGNOSIS — E11.65 TYPE 2 DIABETES MELLITUS WITH HYPERGLYCEMIA: ICD-10-CM

## 2023-12-27 DIAGNOSIS — E78.5 HYPERLIPIDEMIA, UNSPECIFIED: ICD-10-CM

## 2023-12-27 DIAGNOSIS — J45.909 UNSPECIFIED ASTHMA, UNCOMPLICATED: ICD-10-CM

## 2023-12-27 PROCEDURE — ZZZZZ: CPT

## 2023-12-27 RX ORDER — ATORVASTATIN CALCIUM 20 MG/1
20 TABLET, FILM COATED ORAL
Qty: 30 | Refills: 3 | Status: ACTIVE | COMMUNITY
Start: 2023-12-27 | End: 1900-01-01

## 2023-12-27 NOTE — ASSESSMENT
[FreeTextEntry1] : 42 yo tele visit with her  follow up for lab results  The  and pt were on the phone and most of the history was relayed through the .  #) HLD:  WNL, cholesterol 185 WNL, HDL 38, , non- - ASCVD 2.6-5.4% - start atorvastatin 20   #) Uncontrolled DM: - AIC 8.1 - restart metformin 500 BID  #) Asthma: Hasn't used the inhaler in many months, hasn't had an asthma exacerbation in many years  HCM: mammogram - staff to give pt number to call to make appt pap smear - staff to give pt number to call to make appt never smoker f/u in 2 months with blood work

## 2023-12-27 NOTE — END OF VISIT
[] : Resident [FreeTextEntry3] : I was present with the Resident during the key portions of this encounter and provided supervision via audio technology.  I agree with the findings and plan as documented in the Resident's note, unless noted below.

## 2023-12-27 NOTE — HISTORY OF PRESENT ILLNESS
[Home] : at home, [unfilled] , at the time of the visit. [Medical Office: ([facility])___] : at the medical office located in  [Verbal consent obtained from patient] : the patient, [unfilled] [Spouse] : spouse [FreeTextEntry8] : CC follow up with labs

## 2024-01-02 DIAGNOSIS — E78.5 HYPERLIPIDEMIA, UNSPECIFIED: ICD-10-CM

## 2024-01-02 DIAGNOSIS — J45.909 UNSPECIFIED ASTHMA, UNCOMPLICATED: ICD-10-CM

## 2024-01-02 DIAGNOSIS — E11.65 TYPE 2 DIABETES MELLITUS WITH HYPERGLYCEMIA: ICD-10-CM

## 2024-02-26 ENCOUNTER — RX RENEWAL (OUTPATIENT)
Age: 44
End: 2024-02-26

## 2024-04-17 ENCOUNTER — RX RENEWAL (OUTPATIENT)
Age: 44
End: 2024-04-17

## 2024-04-17 RX ORDER — METFORMIN HYDROCHLORIDE 500 MG/1
500 TABLET, COATED ORAL TWICE DAILY
Qty: 60 | Refills: 5 | Status: ACTIVE | COMMUNITY
Start: 2023-12-27 | End: 1900-01-01

## 2024-05-03 ENCOUNTER — APPOINTMENT (OUTPATIENT)
Dept: PULMONOLOGY | Facility: CLINIC | Age: 44
End: 2024-05-03

## 2024-06-26 ENCOUNTER — RX RENEWAL (OUTPATIENT)
Age: 44
End: 2024-06-26

## 2024-06-26 RX ORDER — ALBUTEROL SULFATE 90 UG/1
108 (90 BASE) INHALANT RESPIRATORY (INHALATION) EVERY 4 HOURS
Qty: 6.7 | Refills: 3 | Status: ACTIVE | COMMUNITY
Start: 2023-12-04 | End: 1900-01-01

## 2024-11-26 ENCOUNTER — NON-APPOINTMENT (OUTPATIENT)
Age: 44
End: 2024-11-26

## 2024-11-26 ENCOUNTER — INPATIENT (INPATIENT)
Facility: HOSPITAL | Age: 44
LOS: 1 days | Discharge: LEFT BEFORE DISCHARGE | DRG: 141 | End: 2024-11-28
Attending: STUDENT IN AN ORGANIZED HEALTH CARE EDUCATION/TRAINING PROGRAM | Admitting: STUDENT IN AN ORGANIZED HEALTH CARE EDUCATION/TRAINING PROGRAM
Payer: MEDICAID

## 2024-11-26 VITALS
WEIGHT: 149.91 LBS | HEART RATE: 110 BPM | DIASTOLIC BLOOD PRESSURE: 68 MMHG | RESPIRATION RATE: 18 BRPM | TEMPERATURE: 100 F | OXYGEN SATURATION: 94 % | SYSTOLIC BLOOD PRESSURE: 123 MMHG

## 2024-11-26 LAB
ALBUMIN SERPL ELPH-MCNC: 4.7 G/DL — SIGNIFICANT CHANGE UP (ref 3.5–5.2)
ALP SERPL-CCNC: 61 U/L — SIGNIFICANT CHANGE UP (ref 30–115)
ALT FLD-CCNC: 34 U/L — SIGNIFICANT CHANGE UP (ref 0–41)
ANION GAP SERPL CALC-SCNC: 15 MMOL/L — HIGH (ref 7–14)
APTT BLD: 31.6 SEC — SIGNIFICANT CHANGE UP (ref 27–39.2)
AST SERPL-CCNC: 21 U/L — SIGNIFICANT CHANGE UP (ref 0–41)
BASOPHILS # BLD AUTO: 0 K/UL — SIGNIFICANT CHANGE UP (ref 0–0.2)
BASOPHILS NFR BLD AUTO: 0 % — SIGNIFICANT CHANGE UP (ref 0–1)
BILIRUB SERPL-MCNC: 0.9 MG/DL — SIGNIFICANT CHANGE UP (ref 0.2–1.2)
BUN SERPL-MCNC: 10 MG/DL — SIGNIFICANT CHANGE UP (ref 10–20)
CALCIUM SERPL-MCNC: 9.7 MG/DL — SIGNIFICANT CHANGE UP (ref 8.4–10.4)
CHLORIDE SERPL-SCNC: 101 MMOL/L — SIGNIFICANT CHANGE UP (ref 98–110)
CO2 SERPL-SCNC: 22 MMOL/L — SIGNIFICANT CHANGE UP (ref 17–32)
CREAT SERPL-MCNC: 0.7 MG/DL — SIGNIFICANT CHANGE UP (ref 0.7–1.5)
D DIMER BLD IA.RAPID-MCNC: <150 NG/ML DDU — SIGNIFICANT CHANGE UP
EGFR: 109 ML/MIN/1.73M2 — SIGNIFICANT CHANGE UP
EOSINOPHIL # BLD AUTO: 0 K/UL — SIGNIFICANT CHANGE UP (ref 0–0.7)
EOSINOPHIL NFR BLD AUTO: 0 % — SIGNIFICANT CHANGE UP (ref 0–8)
FLUAV AG NPH QL: SIGNIFICANT CHANGE UP
FLUBV AG NPH QL: SIGNIFICANT CHANGE UP
GAS PNL BLDV: SIGNIFICANT CHANGE UP
GLUCOSE SERPL-MCNC: 124 MG/DL — HIGH (ref 70–99)
HCG SERPL QL: NEGATIVE — SIGNIFICANT CHANGE UP
HCT VFR BLD CALC: 38.1 % — SIGNIFICANT CHANGE UP (ref 37–47)
HGB BLD-MCNC: 13.2 G/DL — SIGNIFICANT CHANGE UP (ref 12–16)
INR BLD: 1.21 RATIO — SIGNIFICANT CHANGE UP (ref 0.65–1.3)
LYMPHOCYTES # BLD AUTO: 0.71 K/UL — LOW (ref 1.2–3.4)
LYMPHOCYTES # BLD AUTO: 5.2 % — LOW (ref 20.5–51.1)
MCHC RBC-ENTMCNC: 29.8 PG — SIGNIFICANT CHANGE UP (ref 27–31)
MCHC RBC-ENTMCNC: 34.6 G/DL — SIGNIFICANT CHANGE UP (ref 32–37)
MCV RBC AUTO: 86 FL — SIGNIFICANT CHANGE UP (ref 81–99)
MONOCYTES # BLD AUTO: 0.83 K/UL — HIGH (ref 0.1–0.6)
MONOCYTES NFR BLD AUTO: 6.1 % — SIGNIFICANT CHANGE UP (ref 1.7–9.3)
NEUTROPHILS # BLD AUTO: 11.8 K/UL — HIGH (ref 1.4–6.5)
NEUTROPHILS NFR BLD AUTO: 81.7 % — HIGH (ref 42.2–75.2)
NT-PROBNP SERPL-SCNC: <36 PG/ML — SIGNIFICANT CHANGE UP (ref 0–300)
PLATELET # BLD AUTO: 270 K/UL — SIGNIFICANT CHANGE UP (ref 130–400)
PMV BLD: 10.9 FL — HIGH (ref 7.4–10.4)
POTASSIUM SERPL-MCNC: 3.4 MMOL/L — LOW (ref 3.5–5)
POTASSIUM SERPL-SCNC: 3.4 MMOL/L — LOW (ref 3.5–5)
PROT SERPL-MCNC: 7.2 G/DL — SIGNIFICANT CHANGE UP (ref 6–8)
PROTHROM AB SERPL-ACNC: 14.3 SEC — HIGH (ref 9.95–12.87)
RBC # BLD: 4.43 M/UL — SIGNIFICANT CHANGE UP (ref 4.2–5.4)
RBC # FLD: 12.8 % — SIGNIFICANT CHANGE UP (ref 11.5–14.5)
RSV RNA NPH QL NAA+NON-PROBE: SIGNIFICANT CHANGE UP
SARS-COV-2 RNA SPEC QL NAA+PROBE: SIGNIFICANT CHANGE UP
SODIUM SERPL-SCNC: 138 MMOL/L — SIGNIFICANT CHANGE UP (ref 135–146)
WBC # BLD: 13.58 K/UL — HIGH (ref 4.8–10.8)
WBC # FLD AUTO: 13.58 K/UL — HIGH (ref 4.8–10.8)

## 2024-11-26 PROCEDURE — 99285 EMERGENCY DEPT VISIT HI MDM: CPT

## 2024-11-26 PROCEDURE — 71045 X-RAY EXAM CHEST 1 VIEW: CPT | Mod: 26

## 2024-11-26 PROCEDURE — 93010 ELECTROCARDIOGRAM REPORT: CPT

## 2024-11-26 RX ORDER — METHYLPREDNISOLONE SOD SUCC 125 MG
125 VIAL (EA) INJECTION ONCE
Refills: 0 | Status: COMPLETED | OUTPATIENT
Start: 2024-11-26 | End: 2024-11-26

## 2024-11-26 RX ORDER — POTASSIUM CHLORIDE 600 MG/1
40 TABLET, EXTENDED RELEASE ORAL ONCE
Refills: 0 | Status: COMPLETED | OUTPATIENT
Start: 2024-11-26 | End: 2024-11-26

## 2024-11-26 RX ORDER — IPRATROPIUM BROMIDE AND ALBUTEROL SULFATE 2.5; .5 MG/3ML; MG/3ML
3 SOLUTION RESPIRATORY (INHALATION)
Refills: 0 | Status: COMPLETED | OUTPATIENT
Start: 2024-11-26 | End: 2024-11-26

## 2024-11-26 RX ADMIN — IPRATROPIUM BROMIDE AND ALBUTEROL SULFATE 3 MILLILITER(S): 2.5; .5 SOLUTION RESPIRATORY (INHALATION) at 22:46

## 2024-11-26 RX ADMIN — IPRATROPIUM BROMIDE AND ALBUTEROL SULFATE 3 MILLILITER(S): 2.5; .5 SOLUTION RESPIRATORY (INHALATION) at 22:43

## 2024-11-26 RX ADMIN — Medication 125 MILLIGRAM(S): at 23:51

## 2024-11-26 RX ADMIN — POTASSIUM CHLORIDE 40 MILLIEQUIVALENT(S): 600 TABLET, EXTENDED RELEASE ORAL at 23:33

## 2024-11-26 RX ADMIN — IPRATROPIUM BROMIDE AND ALBUTEROL SULFATE 3 MILLILITER(S): 2.5; .5 SOLUTION RESPIRATORY (INHALATION) at 22:45

## 2024-11-26 NOTE — ED ADULT TRIAGE NOTE - CHIEF COMPLAINT QUOTE
Pt presents to the ED c/o of SOB and fever. Tylenol 975 given at urgent care 15 minutes ago. (-) COVID (-) RSV. Pts  has COVID. Pt supposedly had 103F at urgent care

## 2024-11-27 ENCOUNTER — TRANSCRIPTION ENCOUNTER (OUTPATIENT)
Age: 44
End: 2024-11-27

## 2024-11-27 DIAGNOSIS — R50.9 FEVER, UNSPECIFIED: ICD-10-CM

## 2024-11-27 LAB
GAS PNL BLDA: SIGNIFICANT CHANGE UP
HMPV RNA SPEC QL NAA+PROBE: DETECTED
RAPID RVP RESULT: DETECTED
SARS-COV-2 RNA SPEC QL NAA+PROBE: SIGNIFICANT CHANGE UP

## 2024-11-27 PROCEDURE — 99223 1ST HOSP IP/OBS HIGH 75: CPT

## 2024-11-27 PROCEDURE — 80053 COMPREHEN METABOLIC PANEL: CPT

## 2024-11-27 PROCEDURE — 83605 ASSAY OF LACTIC ACID: CPT

## 2024-11-27 PROCEDURE — 85014 HEMATOCRIT: CPT

## 2024-11-27 PROCEDURE — 82330 ASSAY OF CALCIUM: CPT

## 2024-11-27 PROCEDURE — 82962 GLUCOSE BLOOD TEST: CPT

## 2024-11-27 PROCEDURE — 94640 AIRWAY INHALATION TREATMENT: CPT

## 2024-11-27 PROCEDURE — 84295 ASSAY OF SERUM SODIUM: CPT

## 2024-11-27 PROCEDURE — 71045 X-RAY EXAM CHEST 1 VIEW: CPT

## 2024-11-27 PROCEDURE — 83036 HEMOGLOBIN GLYCOSYLATED A1C: CPT

## 2024-11-27 PROCEDURE — 71275 CT ANGIOGRAPHY CHEST: CPT | Mod: 26

## 2024-11-27 PROCEDURE — 82803 BLOOD GASES ANY COMBINATION: CPT

## 2024-11-27 PROCEDURE — 0225U NFCT DS DNA&RNA 21 SARSCOV2: CPT

## 2024-11-27 PROCEDURE — 85018 HEMOGLOBIN: CPT

## 2024-11-27 PROCEDURE — 85025 COMPLETE CBC W/AUTO DIFF WBC: CPT

## 2024-11-27 PROCEDURE — 71045 X-RAY EXAM CHEST 1 VIEW: CPT | Mod: 26

## 2024-11-27 PROCEDURE — 71275 CT ANGIOGRAPHY CHEST: CPT | Mod: MC

## 2024-11-27 PROCEDURE — 84132 ASSAY OF SERUM POTASSIUM: CPT

## 2024-11-27 PROCEDURE — 36415 COLL VENOUS BLD VENIPUNCTURE: CPT

## 2024-11-27 RX ORDER — IPRATROPIUM BROMIDE AND ALBUTEROL SULFATE 2.5; .5 MG/3ML; MG/3ML
3 SOLUTION RESPIRATORY (INHALATION) EVERY 6 HOURS
Refills: 0 | Status: DISCONTINUED | OUTPATIENT
Start: 2024-11-27 | End: 2024-11-28

## 2024-11-27 RX ORDER — METHYLPREDNISOLONE SOD SUCC 125 MG
40 VIAL (EA) INJECTION EVERY 8 HOURS
Refills: 0 | Status: DISCONTINUED | OUTPATIENT
Start: 2024-11-27 | End: 2024-11-28

## 2024-11-27 RX ORDER — METHYLPREDNISOLONE SOD SUCC 125 MG
40 VIAL (EA) INJECTION EVERY 8 HOURS
Refills: 0 | Status: DISCONTINUED | OUTPATIENT
Start: 2024-11-27 | End: 2024-11-27

## 2024-11-27 RX ORDER — ENOXAPARIN SODIUM 30 MG/.3ML
40 INJECTION SUBCUTANEOUS EVERY 24 HOURS
Refills: 0 | Status: DISCONTINUED | OUTPATIENT
Start: 2024-11-27 | End: 2024-11-28

## 2024-11-27 RX ORDER — ORAL SEMAGLUTIDE 7 MG/1
0.5 TABLET ORAL
Refills: 0 | DISCHARGE

## 2024-11-27 RX ORDER — 0.9 % SODIUM CHLORIDE 0.9 %
1000 INTRAVENOUS SOLUTION INTRAVENOUS
Refills: 0 | Status: DISCONTINUED | OUTPATIENT
Start: 2024-11-27 | End: 2024-11-28

## 2024-11-27 RX ORDER — GLUCAGON INJECTION, SOLUTION 0.5 MG/.1ML
1 INJECTION, SOLUTION SUBCUTANEOUS ONCE
Refills: 0 | Status: DISCONTINUED | OUTPATIENT
Start: 2024-11-27 | End: 2024-11-28

## 2024-11-27 RX ADMIN — IPRATROPIUM BROMIDE AND ALBUTEROL SULFATE 3 MILLILITER(S): 2.5; .5 SOLUTION RESPIRATORY (INHALATION) at 08:45

## 2024-11-27 RX ADMIN — Medication 40 MILLIGRAM(S): at 14:39

## 2024-11-27 RX ADMIN — Medication 40 MILLIGRAM(S): at 21:16

## 2024-11-27 RX ADMIN — Medication 2: at 17:40

## 2024-11-27 RX ADMIN — IPRATROPIUM BROMIDE AND ALBUTEROL SULFATE 3 MILLILITER(S): 2.5; .5 SOLUTION RESPIRATORY (INHALATION) at 19:37

## 2024-11-27 RX ADMIN — IPRATROPIUM BROMIDE AND ALBUTEROL SULFATE 3 MILLILITER(S): 2.5; .5 SOLUTION RESPIRATORY (INHALATION) at 14:39

## 2024-11-27 RX ADMIN — Medication 4: at 08:46

## 2024-11-27 RX ADMIN — Medication 50 GRAM(S): at 00:25

## 2024-11-27 RX ADMIN — Medication 40 MILLIGRAM(S): at 06:58

## 2024-11-27 NOTE — H&P ADULT - NSHPPHYSICALEXAM_GEN_ALL_CORE
GENERAL: NAD, lying in bed comfortably  HEAD:  Atraumatic, normocephalic  EYES: EOMI, PERRL  NECK: Supple, trachea midline, no JVD  HEART: Regular rate and rhythm  LUNGS: Unlabored respirations.  Clear to auscultation bilaterally, no crackles, wheezing, or rhonchi  ABDOMEN: Soft, nontender, nondistended, +BS  EXTREMITIES: 2+ peripheral pulses bilaterally. No clubbing, cyanosis, or edema  NERVOUS SYSTEM:  A&Ox3, moving all extremities, no focal deficits GENERAL: NAD, lying in bed comfortably  HEAD:  Atraumatic, normocephalic  EYES: EOMI, PERRL  NECK: Supple, trachea midline, no JVD  HEART: Regular rate and rhythm  Throat: b/l erythema present, no whitish patch, tonsil not enlarged  LUNGS: Unlabored respirations. hu expiratory wheeze, no crepitations,   ABDOMEN: Soft, nontender, nondistended, +BS  EXTREMITIES: 2+ peripheral pulses bilaterally. No clubbing, cyanosis, or edema  NERVOUS SYSTEM:  A&Ox3, moving all extremities, no focal deficits

## 2024-11-27 NOTE — DISCHARGE NOTE PROVIDER - HOSPITAL COURSE
44-year-old female PMH asthma, diabetes (metformin, Ozempic) coming to ED for shortness of breath and fever.  Patient reports x 1 week has had intermittent shortness of breath, nonexertional, with associated fever Tmax 103F.  Has associated dry cough.  Reports family also has fever and cough,  tested positive for COVID this week.  Has been taking nebulized albuterol this week with some relief.  Denies recent travel, chest pain, abdominal pain, nausea vomiting diarrhea constipation, urinary symptoms, leg pain/swelling, hormone use.    ED vitals were:  BP: 123/68,  RR 18, Temp 99.5( 103 HIE), on 3L NC   Lab showed wbc 13.58, dimer negative, K 3.4 corrected, BNP 36 , lactate normal, RVP negative  Chest xray negative    Discussion of discharge plan of care, including discharge diagnoses, medication reconciliation, and follow-ups was conducted with Dr. Longoria on 11/27/2024, and discharge was approved.    # SIRS ( fever tachy leucocytosis) on admission  # acute exacerbation of asthma vs PE vs Pna vs acute bronchitis vs acute pharynitis  - fever and cough (on/off for 1 week), mostly dry cough, cough on deep inspiration   -  younger child got sick recently, then  was covid positive yesterday   - no recent diarrhoea, travel history, jaundice, arthralgia, joint tenderness,  normal bladder and bowel habits, does not use drugs  - able to speak sentences without difficulty, pharyngeal erythematous  - ED vitals were: BP: 123/68,  RR 18, Temp 99.5( 103 HIE), on 2L NC   - Lab: wbc 13.58, dimer negative, K 3.4 corrected, BNP 36 , lactate normal  - Chest xray negative  - ED T/t: methylprednisone, duonebs, mg/K supplement  - d/c o2 supplement , methylprednisone 40 TID, duoneb q6h standing   - COVID-19 negative  - human metapneumovirus positive  - ID consult   - F/u Bcx     # DM   - metformin and ozempic   - ISS, monitor BS     44-year-old female PMH asthma, diabetes (metformin, Ozempic) coming to ED for shortness of breath and fever.  Patient reports x 1 week has had intermittent shortness of breath, nonexertional, with associated fever Tmax 103F.  Has associated dry cough.  Reports family also has fever and cough,  tested positive for COVID this week.  Has been taking nebulized albuterol this week with some relief.  Denies recent travel, chest pain, abdominal pain, nausea vomiting diarrhea constipation, urinary symptoms, leg pain/swelling, hormone use.    ED vitals were:  BP: 123/68,  RR 18, Temp 99.5( 103 HIE), on 3L NC   Lab showed wbc 13.58, dimer negative, K 3.4 corrected, BNP 36 , lactate normal, RVP negative  Chest xray negative    Discussion of discharge plan of care, including discharge diagnoses, medication reconciliation, and follow-ups was conducted with Dr. Longoria on 11/27/2024, and discharge was approved.    # SIRS ( fever tachy leucocytosis) on admission  # acute exacerbation of asthma vs PE vs Pna vs acute bronchitis vs acute pharyngitics.     - fever and cough (on/off for 1 week), mostly dry cough, cough on deep inspiration   -  younger child got sick recently, then  was covid positive yesterday   - no recent diarrhoea, travel history, jaundice, arthralgia, joint tenderness,  normal bladder and bowel habits, does not use drugs  - able to speak sentences without difficulty, pharyngeal erythematous  - ED vitals were: BP: 123/68,  RR 18, Temp 99.5( 103 HIE), on 2L NC   - Lab: wbc 13.58, dimer negative, K 3.4 corrected, BNP 36 , lactate normal  - Chest xray negative  - ED T/t: methylprednisone, duonebs, mg/K supplement  - d/c o2 supplement , methylprednisone 40 TID, duoneb q6h standing   - COVID-19 negative  - human metapneumovirus positive  - ID consult   - F/u Bcx     # DM   - metformin and ozempic   - ISS, monitor BS    I informed this patient of the need for further medical evaluation and care given the patient's current medical condition.   This patient refused further medical evaluation and treatment and expressed a strong desire to leave the emergency department.  I informed this patient of the benefits of further medical evaluation and care at this facility.  I informed this patient of the risks of leaving against our medical advice without properly completing our evaluation today.  These risks included illness, injury, permanent disability and even death.  I informed the patient of the possible necessity for hospital admission depending on future findings.  The patient understood the risks and benefits of further medical treatment and the possible need for admission.  The patient continued to vehemently refuse further evaluation and treatment and continued to express a strong desire  to leave the emergency department.  At the time of discussion this patient maintained full faculties of judgement and medical decision making capacity (see below).    In accordance with this patient's wishes the patient was discharged from the emergency department against our medical advice.    The pt requested to be discharged today. Ambulatory sats were performed. At rest, O2 sat was 93% on 2LNC. On ambulation, O2 sat was 91-93% on 2LNC. The pt needs need oxygen, but the pt does not want to wait for further medical management and wishes to be discharged against medical advice.   Case discussed with Dr Luis HILL.

## 2024-11-27 NOTE — CONSULT NOTE ADULT - ASSESSMENT
ASSESSMENT  44-year-old female PMH asthma, diabetes (metformin, Ozempic) coming to ED for shortness of breath and fever.  Patient reports x 1 week has had intermittent shortness of breath, nonexertional, with associated fever Tmax 103F.  Has associated dry cough.  Reports family also has fever and cough,  tested positive for COVID this week.      IMPRESSION  #hMPV    CXR No radiographic evidence of acute cardiopulmonary disease.  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 All final recommendations to follow after interview and examination of the patient. Please follow recommendations noted below.  - Supportive care, monitor off antimicrobials     If any questions, please send a message or call on [employer] Teams  Please continue to update ID with any pertinent new laboratory, radiographic findings, or change in clinical status ASSESSMENT  44-year-old female PMH asthma, diabetes (metformin, Ozempic) coming to ED for shortness of breath and fever.  Patient reports x 1 week has had intermittent shortness of breath, nonexertional, with associated fever Tmax 103F.  Has associated dry cough.  Reports family also has fever and cough,  tested positive for COVID this week.      IMPRESSION  #hMPV    CXR No radiographic evidence of acute cardiopulmonary disease.  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 Please inform ID of any patient clinical change or any new pertinent laboratory or radiographic data     If any questions, please send a message or call on [email or web address] Teams  Please continue to update ID with any pertinent new laboratory, radiographic findings, or change in clinical status

## 2024-11-27 NOTE — DISCHARGE NOTE PROVIDER - TIME SPENT: (MINUTES SPENT ON THE DISCHARGE SERVICE)
Patient's wife was notified no specific preference, something similar to Centrum for Men would be fine. (per Dr Carpio)  
Pt's spouse calling and states that pt is out of the Multi vitamins. Spouse wanted to know what multi vitamins does  recommends..    
35

## 2024-11-27 NOTE — ED PROVIDER NOTE - DIFFERENTIAL DIAGNOSIS
Differential Diagnosis Electrolyte abnormalities, dehydration, MANASA, hyperglycemia, hypoglycemia, symptomatic anemia.  r/o pneumonia.

## 2024-11-27 NOTE — DISCHARGE NOTE PROVIDER - NSDCMRMEDTOKEN_GEN_ALL_CORE_FT
metFORMIN 500 mg oral tablet: 1 tab(s) orally 2 times a day  Ozempic 2 mg/1.5 mL (0.25 mg or 0.5 mg dose) subcutaneous solution: 0.5 milligram(s) subcutaneously once a week   ipratropium-albuterol 0.5 mg-2.5 mg/3 mL inhalation solution: 3 milliliter(s) inhaled every 6 hours  metFORMIN 500 mg oral tablet: 1 tab(s) orally 2 times a day  Ozempic 2 mg/1.5 mL (0.25 mg or 0.5 mg dose) subcutaneous solution: 0.5 milligram(s) subcutaneously once a week  predniSONE 20 mg oral tablet: 2 tab(s) orally once a day

## 2024-11-27 NOTE — H&P ADULT - NSHPLABSRESULTS_GEN_ALL_CORE
LABS:                          13.2   13.58 )-----------( 270      ( 26 Nov 2024 22:01 )             38.1     11-26    138  |  101  |  10  ----------------------------<  124[H]  3.4[L]   |  22  |  0.7    Ca    9.7      26 Nov 2024 22:01    TPro  7.2  /  Alb  4.7  /  TBili  0.9  /  DBili  x   /  AST  21  /  ALT  34  /  AlkPhos  61  11-26    LIVER FUNCTIONS - ( 26 Nov 2024 22:01 )  Alb: 4.7 g/dL / Pro: 7.2 g/dL / ALK PHOS: 61 U/L / ALT: 34 U/L / AST: 21 U/L / GGT: x           PT/INR - ( 26 Nov 2024 22:01 )   PT: 14.30 sec;   INR: 1.21 ratio         PTT - ( 26 Nov 2024 22:01 )  PTT:31.6 sec  Urinalysis Basic - ( 26 Nov 2024 22:01 )    Color: x / Appearance: x / SG: x / pH: x  Gluc: 124 mg/dL / Ketone: x  / Bili: x / Urobili: x   Blood: x / Protein: x / Nitrite: x   Leuk Esterase: x / RBC: x / WBC x   Sq Epi: x / Non Sq Epi: x / Bacteria: x

## 2024-11-27 NOTE — ED PROVIDER NOTE - EKG/XRAY ADDITIONAL INFORMATION
Sheath #all: Closed using manual compression and Hemostasis Pad. Site secured by Tegaderm. Pressure held for: 15 minutes. EKG shows sinus tachycardia, HR is108 and QRS is 86; no STEMI.   Chest X-rays reviewed and interpreted by me Dr. Goncalves and shows no actue findings. No Pneumothorax, no free air, no effusions, and these findings discussed with patient.

## 2024-11-27 NOTE — DISCHARGE NOTE PROVIDER - CARE PROVIDER_API CALL
Kimberlee Smith  Internal Medicine  [address] [place] [address]  Phone: [phone]  Fax: [phone]  Follow Up Time: 2 weeks

## 2024-11-27 NOTE — H&P ADULT - ASSESSMENT
44-year-old female PMH asthma, diabetes (metformin, Ozempic) coming to ED for shortness of breath and fever.  Patient reports x 1 week has had intermittent shortness of breath, nonexertional, with associated fever Tmax 103F.  Has associated dry cough.  Reports family also has fever and cough,  tested positive for COVID this week.  Has been taking nebulized albuterol this week with some relief.  Denies recent travel, chest pain, abdominal pain, nausea vomiting diarrhea constipation, urinary symptoms, leg pain/swelling, hormone use.        #Assessment and plan:    # SIRS ( fever tachy leucocytosis)  # acute exacerbation of asthma vs PE vs Pna    -  recently diagnosed with covid  - RVP negative, Full RVP panel sent   - no recent diarrhoea, travel history, jaundice, normal bladder and bowel habits, does not use drugs  - able to speak sentences without difficulty   ED vitals were:  BP: 123/68,  RR 18, Temp 99.5( 103 HIE), on 3L NC   Lab: wbc 13.58, dimer negative, K 3.4 corrected, BNP 36 , lactate normal  Chest xray: read pending  ED T/t: methylprednisone, duonebs, mg/K supplement  C/w ceftriazone and azithromycin,   c/w o2 supplement , methylprednisone 40 TID  ID consult   F/u Bcx   echo? r/o vegetation if fever persist and workup negative   Monitor vitals closely       # DM   - ISS, monitor BS        DVT: lovenox  Diet: CC diet  activity as tolerated  dispo: med 44-year-old female PMH asthma, diabetes (metformin, Ozempic) coming to ED for shortness of breath and fever.  Patient reports x 1 week has had intermittent shortness of breath, nonexertional, with associated fever Tmax 103F.  Has associated dry cough.  Reports family also has fever and cough,  tested positive for COVID this week.  Has been taking nebulized albuterol this week with some relief.  Denies recent travel, chest pain, abdominal pain, nausea vomiting diarrhea constipation, urinary symptoms, leg pain/swelling, hormone use.        #Assessment and plan:    # SIRS ( fever tachy leucocytosis) on admission  # acute exacerbation of asthma vs PE vs Pna vs acute bronchitis vs acute pharynitis    - fever and cough (on/off for 1 week), mostly dry cough, cough on deep inspiration   -  younger child got sick recently, then  was covid positive yesterday   - no recent diarrhoea, travel history, jaundice, arthralgia, joint tenderness,  normal bladder and bowel habits, does not use drugs  - able to speak sentences without difficulty, pharyngeal erythematous  ED vitals were:  BP: 123/68,  RR 18, Temp 99.5( 103 HIE), on 2L NC   Lab: wbc 13.58, dimer negative, K 3.4 corrected, BNP 36 , lactate normal  Chest xray: read pending  ED T/t: methylprednisone, duonebs, mg/K supplement  c/w o2 supplement , methylprednisone 40 TID, duoneb q6h standing   RVP negative, Full RVP panel sent   ID consult   F/u Bcx   echo? r/o vegetation if fever persist and workup negative   wean off o2    # DM   - metformin and ozempic   - ISS, monitor BS      DVT: lovenox  Diet: CC diet  activity as tolerated  dispo: med 44-year-old female PMH asthma, diabetes (metformin, Ozempic) coming to ED for shortness of breath and fever.  Patient reports x 1 week has had intermittent shortness of breath, nonexertional, with associated fever Tmax 103F.  Has associated dry cough.  Reports family also has fever and cough,  tested positive for COVID this week.  Has been taking nebulized albuterol this week with some relief.  Denies recent travel, chest pain, abdominal pain, nausea vomiting diarrhea constipation, urinary symptoms, leg pain/swelling, hormone use.        #Assessment and plan:    # SIRS ( fever tachy leucocytosis) on admission  # acute exacerbation of asthma vs PE vs Pna vs acute bronchitis vs acute pharynitis    - fever and cough (on/off for 1 week), mostly dry cough, cough on deep inspiration   -  younger child got sick recently, then  was covid positive yesterday   - no recent diarrhoea, travel history, jaundice, arthralgia, joint tenderness,  normal bladder and bowel habits, does not use drugs  - able to speak sentences without difficulty, pharyngeal erythematous  ED vitals were:  BP: 123/68,  RR 18, Temp 99.5( 103 HIE), on 2L NC   Lab: wbc 13.58, dimer negative, K 3.4 corrected, BNP 36 , lactate normal  Chest xray: read pending  ED T/t: methylprednisone, duonebs, mg/K supplement  c/w o2 supplement , methylprednisone 40 TID, duoneb q6h standing   RVP negative, Full RVP panel sent   ID consult   F/u Bcx   wean off o2    # DM   - metformin and ozempic   - ISS, monitor BS      DVT: lovenox  Diet: CC diet  activity as tolerated  dispo: med

## 2024-11-27 NOTE — CONSULT NOTE ADULT - SUBJECTIVE AND OBJECTIVE BOX
WANDA MCCOLLUM  44y, Female  Allergy: No Known Allergies      CHIEF COMPLAINT:   Fever, cough (27 Nov 2024 02:43)      LOS      HPI  HPI:  44-year-old female PMH asthma, diabetes (metformin, Ozempic) coming to ED for shortness of breath and fever.  Patient reports x 1 week has had intermittent shortness of breath, nonexertional, with associated fever Tmax 103F.  Has associated dry cough.  Reports family also has fever and cough,  tested positive for COVID this week.  Has been taking nebulized albuterol this week with some relief.  Denies recent travel, chest pain, abdominal pain, nausea vomiting diarrhea constipation, urinary symptoms, leg pain/swelling, hormone use.    ED vitals were:  BP: 123/68,  RR 18, Temp 99.5( 103 HIE), on 3L NC   Lab showed wbc 13.58, dimer negative, K 3.4 corrected, BNP 36 , lactate normal, RVP negative  Chest xray: read pending         (27 Nov 2024 02:43)      INFECTIOUS DISEASE HISTORY:  ID consulted for rule out PNA   COVID +, RVP (+) hMPV here  CXR no PNA    Currently ordered for:      [facility]  PAST MEDICAL & SURGICAL HISTORY:  Diabetes mellitus      History of asthma          FAMILY HISTORY      SOCIAL HISTORY  Social History:        ROS  ***    VITALS:  T(F): 98.2, Max: 99.5 (11-26-24 @ 20:49)  HR: 88  BP: 105/73  RR: 18Vital Signs Last 24 Hrs  T(C): 36.8 (27 Nov 2024 07:39), Max: 37.5 (26 Nov 2024 20:49)  T(F): 98.2 (27 Nov 2024 07:39), Max: 99.5 (26 Nov 2024 20:49)  HR: 88 (27 Nov 2024 07:39) (88 - 131)  BP: 105/73 (27 Nov 2024 07:39) (102/70 - 123/68)  BP(mean): --  RR: 18 (27 Nov 2024 07:39) (18 - 18)  SpO2: 100% (27 Nov 2024 07:39) (92% - 100%)    Parameters below as of 27 Nov 2024 07:39  Patient On (Oxygen Delivery Method): nasal cannula  O2 Flow (L/min): 2      PHYSICAL EXAM:  ***    TESTS & MEASUREMENTS:                        13.2   13.58 )-----------( 270      ( 26 Nov 2024 22:01 )             38.1     11-26    138  |  101  |  10  ----------------------------<  124[H]  3.4[L]   |  22  |  0.7    Ca    9.7      26 Nov 2024 22:01    TPro  7.2  /  Alb  4.7  /  TBili  0.9  /  DBili  x   /  AST  21  /  ALT  34  /  AlkPhos  61  11-26      LIVER FUNCTIONS - ( 26 Nov 2024 22:01 )  Alb: 4.7 g/dL / Pro: 7.2 g/dL / ALK PHOS: 61 U/L / ALT: 34 U/L / AST: 21 U/L / GGT: x           Urinalysis Basic - ( 26 Nov 2024 22:01 )    Color: x / Appearance: x / SG: x / pH: x  Gluc: 124 mg/dL / Ketone: x  / Bili: x / Urobili: x   Blood: x / Protein: x / Nitrite: x   Leuk Esterase: x / RBC: x / WBC x   Sq Epi: x / Non Sq Epi: x / Bacteria: x          Blood Gas Venous - Lactate: 1.2 mmol/L (11-26-24 @ 22:55)      INFECTIOUS DISEASES TESTING  Rapid RVP Result: Detected (11-27-24 @ 03:14)      INFLAMMATORY MARKERS      RADIOLOGY & ADDITIONAL TESTS:  I have personally reviewed the last Chest xray  CXR  Xray Chest 1 View- PORTABLE-Urgent:   ACC: [id number] EXAM:  XR CHEST PORTABLE URGENT 1V   ORDERED BY: QUINTON STERLING     PROCEDURE DATE:  11/26/2024          INTERPRETATION:  CLINICAL HISTORY / REASON FOR EXAM: Shortness of breath.    COMPARISON: None.    TECHNIQUE/POSITIONING: Satisfactory. Single image, AP chest radiograph.    FINDINGS:    SUPPORT DEVICES: None.    CARDIAC/MEDIASTINUM/HILUM: Unremarkable cardiac silhouette.    LUNG PARENCHYMA/PLEURA: No focal consolidation or pleural effusion. No   pneumothorax.    SKELETON/SOFT TISSUES: Unremarkable.      IMPRESSION:    No radiographic evidence of acute cardiopulmonary disease.    --- End of Report ---            BECKIE BARRON MD; Attending Radiologist  This document has been electronically signed. Nov 27 2024  5:12AM (11-26-24 @ 22:27)      CT      CARDIOLOGY TESTING       MEDICATIONS  albuterol/ipratropium for Nebulization 3 Nebulizer every 6 hours  dextrose 5%. 1000 IV Continuous <Continuous>  dextrose 5%. 1000 IV Continuous <Continuous>  dextrose 50% Injectable 25 IV Push once  dextrose 50% Injectable 12.5 IV Push once  dextrose 50% Injectable 25 IV Push once  enoxaparin Injectable 40 SubCutaneous every 24 hours  glucagon  Injectable 1 IntraMuscular once  insulin lispro (ADMELOG) corrective regimen sliding scale  SubCutaneous three times a day before meals      ANTIBIOTICS:      ALLERGIES:  No Known Allergies           WANDA MCCOLLUM  44y, Female  Allergy: No Known Allergies      CHIEF COMPLAINT:   Fever, cough (27 Nov 2024 02:43)      LOS      HPI  HPI:  44-year-old female PMH asthma, diabetes (metformin, Ozempic) coming to ED for shortness of breath and fever.  Patient reports x 1 week has had intermittent shortness of breath, nonexertional, with associated fever Tmax 103F.  Has associated dry cough.  Reports family also has fever and cough,  tested positive for COVID this week.  Has been taking nebulized albuterol this week with some relief.  Denies recent travel, chest pain, abdominal pain, nausea vomiting diarrhea constipation, urinary symptoms, leg pain/swelling, hormone use.    ED vitals were:  BP: 123/68,  RR 18, Temp 99.5( 103 HIE), on 3L NC   Lab showed wbc 13.58, dimer negative, K 3.4 corrected, BNP 36 , lactate normal, RVP negative  Chest xray: read pending         (27 Nov 2024 02:43)      INFECTIOUS DISEASE HISTORY:  ID consulted for rule out PNA   COVID +, RVP (+) hMPV here  CXR no PNA  Reeports children who are sick at home     Currently ordered for: no antimicrobials       PMH  PAST MEDICAL & SURGICAL HISTORY:  Diabetes mellitus      History of asthma          FAMILY HISTORY      SOCIAL HISTORY  Social History:        ROS  General: Denies rigors, nightsweats  HEENT: Denies headache, rhinorrhea, sore throat, eye pain  CV: Denies CP, palpitations  PULM: as noted above   GI: Denies hematemesis, hematochezia, melena  : Denies discharge, hematuria  MSK: Denies arthralgias, myalgias  SKIN: Denies rash, lesions  NEURO: Denies paresthesias, weakness  PSYCH: Denies depression, anxiety     VITALS:  T(F): 98.2, Max: 99.5 (11-26-24 @ 20:49)  HR: 88  BP: 105/73  RR: 18Vital Signs Last 24 Hrs  T(C): 36.8 (27 Nov 2024 07:39), Max: 37.5 (26 Nov 2024 20:49)  T(F): 98.2 (27 Nov 2024 07:39), Max: 99.5 (26 Nov 2024 20:49)  HR: 88 (27 Nov 2024 07:39) (88 - 131)  BP: 105/73 (27 Nov 2024 07:39) (102/70 - 123/68)  BP(mean): --  RR: 18 (27 Nov 2024 07:39) (18 - 18)  SpO2: 100% (27 Nov 2024 07:39) (92% - 100%)    Parameters below as of 27 Nov 2024 07:39  Patient On (Oxygen Delivery Method): nasal cannula  O2 Flow (L/min): 2      PHYSICAL EXAM:  Gen: NAD, resting in bed  HEENT: Normocephalic, atraumatic  Neck: supple, no lymphadenopathy  CV: Regular rate & regular rhythm  Lungs: decreased BS at bases, no fremitus, exp wheezes  Abdomen: Soft, BS present  Ext: Warm, well perfused  Neuro: non focal, awake  Skin: no rash, no erythema  Lines: no phlebitis     TESTS & MEASUREMENTS:                        13.2   13.58 )-----------( 270      ( 26 Nov 2024 22:01 )             38.1     11-26    138  |  101  |  10  ----------------------------<  124[H]  3.4[L]   |  22  |  0.7    Ca    9.7      26 Nov 2024 22:01    TPro  7.2  /  Alb  4.7  /  TBili  0.9  /  DBili  x   /  AST  21  /  ALT  34  /  AlkPhos  61  11-26      LIVER FUNCTIONS - ( 26 Nov 2024 22:01 )  Alb: 4.7 g/dL / Pro: 7.2 g/dL / ALK PHOS: 61 U/L / ALT: 34 U/L / AST: 21 U/L / GGT: x           Urinalysis Basic - ( 26 Nov 2024 22:01 )    Color: x / Appearance: x / SG: x / pH: x  Gluc: 124 mg/dL / Ketone: x  / Bili: x / Urobili: x   Blood: x / Protein: x / Nitrite: x   Leuk Esterase: x / RBC: x / WBC x   Sq Epi: x / Non Sq Epi: x / Bacteria: x          Blood Gas Venous - Lactate: 1.2 mmol/L (11-26-24 @ 22:55)      INFECTIOUS DISEASES TESTING  Rapid RVP Result: Detected (11-27-24 @ 03:14)      INFLAMMATORY MARKERS      RADIOLOGY & ADDITIONAL TESTS:  I have personally reviewed the last Chest xray  CXR  Xray Chest 1 View- PORTABLE-Urgent:   ACC: [id number] EXAM:  XR CHEST PORTABLE URGENT 1V   ORDERED BY: QUINTON STERLING     PROCEDURE DATE:  11/26/2024          INTERPRETATION:  CLINICAL HISTORY / REASON FOR EXAM: Shortness of breath.    COMPARISON: None.    TECHNIQUE/POSITIONING: Satisfactory. Single image, AP chest radiograph.    FINDINGS:    SUPPORT DEVICES: None.    CARDIAC/MEDIASTINUM/HILUM: Unremarkable cardiac silhouette.    LUNG PARENCHYMA/PLEURA: No focal consolidation or pleural effusion. No   pneumothorax.    SKELETON/SOFT TISSUES: Unremarkable.      IMPRESSION:    No radiographic evidence of acute cardiopulmonary disease.    --- End of Report ---            BECKIE BARRON MD; Attending Radiologist  This document has been electronically signed. Nov 27 2024  5:12AM (11-26-24 @ 22:27)      CT      CARDIOLOGY TESTING       MEDICATIONS  albuterol/ipratropium for Nebulization 3 Nebulizer every 6 hours  dextrose 5%. 1000 IV Continuous <Continuous>  dextrose 5%. 1000 IV Continuous <Continuous>  dextrose 50% Injectable 25 IV Push once  dextrose 50% Injectable 12.5 IV Push once  dextrose 50% Injectable 25 IV Push once  enoxaparin Injectable 40 SubCutaneous every 24 hours  glucagon  Injectable 1 IntraMuscular once  insulin lispro (ADMELOG) corrective regimen sliding scale  SubCutaneous three times a day before meals      ANTIBIOTICS:      ALLERGIES:  No Known Allergies

## 2024-11-27 NOTE — ED PROVIDER NOTE - ATTENDING APP SHARED VISIT CONTRIBUTION OF CARE
Patient is c/o cough/congestion/sob/wheezing, has been using home inhalers without relief. Denies recent travel and denies lower ext pain/swelling.   Vitals reviewed   Lungs: B/L wheezing, no crackles,   A/P: Asthma exacerbation,   URI,   r/o pneumonia,   Labs, Nebs,   CXR, EKG, reevaluation,   Meds.

## 2024-11-27 NOTE — ED PROVIDER NOTE - CARE PLAN
Principal Discharge DX:	Asthma exacerbation  Secondary Diagnosis:	Fever  Secondary Diagnosis:	Shortness of breath   1

## 2024-11-27 NOTE — DISCHARGE NOTE PROVIDER - NSDCCPCAREPLAN_GEN_ALL_CORE_FT
PRINCIPAL DISCHARGE DIAGNOSIS  Diagnosis: Asthma exacerbation  Assessment and Plan of Treatment: You were admitted for shortness of breath and fever. You tested positive for human metapneumovirus. You were treated with supplemental oxygen, methylprednisone, and nebulizers. Follow up with your PCP in 2 weeks.      SECONDARY DISCHARGE DIAGNOSES  Diagnosis: Fever  Assessment and Plan of Treatment:     Diagnosis: Shortness of breath  Assessment and Plan of Treatment:      PRINCIPAL DISCHARGE DIAGNOSIS  Diagnosis: Asthma exacerbation  Assessment and Plan of Treatment: You were admitted for shortness of breath and fever. You tested positive for human metapneumovirus. You were treated with supplemental oxygen, steroids, and nebulizers. Follow up with your PCP in 2 weeks.      SECONDARY DISCHARGE DIAGNOSES  Diagnosis: Fever  Assessment and Plan of Treatment:     Diagnosis: Shortness of breath  Assessment and Plan of Treatment:

## 2024-11-27 NOTE — H&P ADULT - HISTORY OF PRESENT ILLNESS
44-year-old female PMH asthma, diabetes (metformin, Ozempic) coming to ED for shortness of breath and fever.  Patient reports x 1 week has had intermittent shortness of breath, nonexertional, with associated fever Tmax 103F.  Has associated dry cough.  Reports family also has fever and cough,  tested positive for COVID this week.  Has been taking nebulized albuterol this week with some relief.  Denies recent travel, chest pain, abdominal pain, nausea vomiting diarrhea constipation, urinary symptoms, leg pain/swelling, hormone use.    ED vitals were:  BP: 123/68,  RR 18, Temp 99.5( 103 HIE), on 3L NC   Lab showed wbc 13.58, dimer negative, K 3.4 corrected, BNP 36 , lactate normal, RVP negative  Chest xray: read pending         44-year-old female PMH asthma, diabetes (metformin, Ozempic) coming to ED for shortness of breath and fever.  Patient reports x 1 week has had intermittent shortness of breath, nonexertional, with associated fever Tmax 103F.  Has associated dry cough.  Reports family also has fever and cough,  tested positive for COVID this week.  Has been taking nebulized albuterol this week with some relief.  Denies recent travel, chest pain, abdominal pain, nausea vomiting diarrhea constipation, urinary symptoms, leg pain/swelling, hormone use.    ED vitals were:  BP: 123/68,  RR 18, Temp 99.5( 103 HIE), on 3L NC   Lab showed wbc 13.58, dimer negative, K 3.4 corrected, BNP 36 , lactate normal, RVP with HMPV  Chest xray: with basilar opacities

## 2024-11-27 NOTE — ED PROVIDER NOTE - OBJECTIVE STATEMENT
Patient is a 44-year-old female PMH asthma, diabetes (metformin, Ozempic) coming to ED for shortness of breath and fever.  Patient reports x 1 week has had intermittent shortness of breath, nonexertional, with associated fever Tmax 103F.  Has associated dry cough.  Reports family also has fever and cough,  tested positive for COVID this week.  Has been taking nebulized albuterol this week with some relief.  Denies recent travel, chest pain, abdominal pain, nausea vomiting diarrhea constipation, urinary symptoms, leg pain/swelling, hormone use

## 2024-11-27 NOTE — ED PROVIDER NOTE - PHYSICAL EXAMINATION
CONST: in NAD  EYES: EOMI, Sclera and conjunctiva clear.   ENT: No nasal discharge. Oropharynx normal appearing, no erythema or exudates. Uvula midline.  NECK: Non-tender  CARD: Normal S1 S2; Normal rate and rhythm  RESP: Equal BS B/L, No rales. No distress. b/l wheeze, r ronchi  GI: Soft, non-tender, non-distended.  MS: Normal ROM in all extremities  SKIN: Warm, dry, Good turgor  NEURO: A&Ox3, No focal deficits. Strength 5/5

## 2024-11-27 NOTE — CONSULT NOTE ADULT - NS ATTEST RISK PROBLEM GEN_ALL_CORE FT
- I independently interpreted the most recent CXR- no PNA  - I discussed my recommendations with the primary team housestadima /Hardik

## 2024-11-27 NOTE — H&P ADULT - ATTENDING COMMENTS
Acute hypoxic respiratory failure on 2L NC  HMPV  DM    -At rest pt satting 92 on RA with good waveform, ambulation 91%  -Dimer negative  -CXR reviewed  -ABG with pO2 only 61, high A-a gradient 45 for age (expected 15 for age)  -check CT chest  -procal, cultures  -monitor off abx and steroids for now  -ID apprecaited  -wean O2 as tolerated

## 2024-11-28 ENCOUNTER — TRANSCRIPTION ENCOUNTER (OUTPATIENT)
Age: 44
End: 2024-11-28

## 2024-11-28 VITALS
SYSTOLIC BLOOD PRESSURE: 136 MMHG | DIASTOLIC BLOOD PRESSURE: 80 MMHG | TEMPERATURE: 98 F | RESPIRATION RATE: 18 BRPM | OXYGEN SATURATION: 93 % | HEART RATE: 121 BPM

## 2024-11-28 LAB
A1C WITH ESTIMATED AVERAGE GLUCOSE RESULT: 5.9 % — HIGH (ref 4–5.6)
ALBUMIN SERPL ELPH-MCNC: 4.5 G/DL — SIGNIFICANT CHANGE UP (ref 3.5–5.2)
ALP SERPL-CCNC: 66 U/L — SIGNIFICANT CHANGE UP (ref 30–115)
ALT FLD-CCNC: 31 U/L — SIGNIFICANT CHANGE UP (ref 0–41)
ANION GAP SERPL CALC-SCNC: 13 MMOL/L — SIGNIFICANT CHANGE UP (ref 7–14)
AST SERPL-CCNC: 16 U/L — SIGNIFICANT CHANGE UP (ref 0–41)
BASOPHILS # BLD AUTO: 0.03 K/UL — SIGNIFICANT CHANGE UP (ref 0–0.2)
BASOPHILS NFR BLD AUTO: 0.2 % — SIGNIFICANT CHANGE UP (ref 0–1)
BILIRUB SERPL-MCNC: 0.8 MG/DL — SIGNIFICANT CHANGE UP (ref 0.2–1.2)
BUN SERPL-MCNC: 20 MG/DL — SIGNIFICANT CHANGE UP (ref 10–20)
CALCIUM SERPL-MCNC: 10 MG/DL — SIGNIFICANT CHANGE UP (ref 8.4–10.4)
CHLORIDE SERPL-SCNC: 103 MMOL/L — SIGNIFICANT CHANGE UP (ref 98–110)
CO2 SERPL-SCNC: 23 MMOL/L — SIGNIFICANT CHANGE UP (ref 17–32)
CREAT SERPL-MCNC: 0.8 MG/DL — SIGNIFICANT CHANGE UP (ref 0.7–1.5)
EGFR: 93 ML/MIN/1.73M2 — SIGNIFICANT CHANGE UP
EOSINOPHIL # BLD AUTO: 0 K/UL — SIGNIFICANT CHANGE UP (ref 0–0.7)
EOSINOPHIL NFR BLD AUTO: 0 % — SIGNIFICANT CHANGE UP (ref 0–8)
ESTIMATED AVERAGE GLUCOSE: 123 MG/DL — HIGH (ref 68–114)
GLUCOSE BLDC GLUCOMTR-MCNC: 258 MG/DL — HIGH (ref 70–99)
GLUCOSE BLDC GLUCOMTR-MCNC: 269 MG/DL — HIGH (ref 70–99)
GLUCOSE SERPL-MCNC: 218 MG/DL — HIGH (ref 70–99)
HCT VFR BLD CALC: 37.9 % — SIGNIFICANT CHANGE UP (ref 37–47)
HGB BLD-MCNC: 12.8 G/DL — SIGNIFICANT CHANGE UP (ref 12–16)
IMM GRANULOCYTES NFR BLD AUTO: 1.3 % — HIGH (ref 0.1–0.3)
LYMPHOCYTES # BLD AUTO: 1.15 K/UL — LOW (ref 1.2–3.4)
LYMPHOCYTES # BLD AUTO: 7.1 % — LOW (ref 20.5–51.1)
MCHC RBC-ENTMCNC: 29.4 PG — SIGNIFICANT CHANGE UP (ref 27–31)
MCHC RBC-ENTMCNC: 33.8 G/DL — SIGNIFICANT CHANGE UP (ref 32–37)
MCV RBC AUTO: 87.1 FL — SIGNIFICANT CHANGE UP (ref 81–99)
MONOCYTES # BLD AUTO: 0.75 K/UL — HIGH (ref 0.1–0.6)
MONOCYTES NFR BLD AUTO: 4.6 % — SIGNIFICANT CHANGE UP (ref 1.7–9.3)
NEUTROPHILS # BLD AUTO: 14.04 K/UL — HIGH (ref 1.4–6.5)
NEUTROPHILS NFR BLD AUTO: 86.8 % — HIGH (ref 42.2–75.2)
NRBC # BLD: 0 /100 WBCS — SIGNIFICANT CHANGE UP (ref 0–0)
PLATELET # BLD AUTO: 329 K/UL — SIGNIFICANT CHANGE UP (ref 130–400)
PMV BLD: 11.6 FL — HIGH (ref 7.4–10.4)
POTASSIUM SERPL-MCNC: 4.2 MMOL/L — SIGNIFICANT CHANGE UP (ref 3.5–5)
POTASSIUM SERPL-SCNC: 4.2 MMOL/L — SIGNIFICANT CHANGE UP (ref 3.5–5)
PROT SERPL-MCNC: 7.3 G/DL — SIGNIFICANT CHANGE UP (ref 6–8)
RBC # BLD: 4.35 M/UL — SIGNIFICANT CHANGE UP (ref 4.2–5.4)
RBC # FLD: 13.4 % — SIGNIFICANT CHANGE UP (ref 11.5–14.5)
SODIUM SERPL-SCNC: 139 MMOL/L — SIGNIFICANT CHANGE UP (ref 135–146)
WBC # BLD: 16.18 K/UL — HIGH (ref 4.8–10.8)
WBC # FLD AUTO: 16.18 K/UL — HIGH (ref 4.8–10.8)

## 2024-11-28 PROCEDURE — 99232 SBSQ HOSP IP/OBS MODERATE 35: CPT

## 2024-11-28 RX ORDER — PREDNISONE 20 MG/1
2 TABLET ORAL
Qty: 10 | Refills: 0
Start: 2024-11-28 | End: 2024-12-02

## 2024-11-28 RX ORDER — PREDNISONE 20 MG/1
40 TABLET ORAL DAILY
Refills: 0 | Status: DISCONTINUED | OUTPATIENT
Start: 2024-11-28 | End: 2024-11-28

## 2024-11-28 RX ORDER — INFLUENZA VIRUS VACCINE 15; 15; 15; 15 UG/.5ML; UG/.5ML; UG/.5ML; UG/.5ML
0.5 SUSPENSION INTRAMUSCULAR ONCE
Refills: 0 | Status: DISCONTINUED | OUTPATIENT
Start: 2024-11-28 | End: 2024-11-28

## 2024-11-28 RX ORDER — IPRATROPIUM BROMIDE AND ALBUTEROL SULFATE 2.5; .5 MG/3ML; MG/3ML
3 SOLUTION RESPIRATORY (INHALATION)
Qty: 60 | Refills: 0
Start: 2024-11-28 | End: 2024-12-02

## 2024-11-28 RX ADMIN — Medication 2: at 08:19

## 2024-11-28 RX ADMIN — IPRATROPIUM BROMIDE AND ALBUTEROL SULFATE 3 MILLILITER(S): 2.5; .5 SOLUTION RESPIRATORY (INHALATION) at 19:33

## 2024-11-28 RX ADMIN — IPRATROPIUM BROMIDE AND ALBUTEROL SULFATE 3 MILLILITER(S): 2.5; .5 SOLUTION RESPIRATORY (INHALATION) at 02:24

## 2024-11-28 RX ADMIN — IPRATROPIUM BROMIDE AND ALBUTEROL SULFATE 3 MILLILITER(S): 2.5; .5 SOLUTION RESPIRATORY (INHALATION) at 09:06

## 2024-11-28 RX ADMIN — IPRATROPIUM BROMIDE AND ALBUTEROL SULFATE 3 MILLILITER(S): 2.5; .5 SOLUTION RESPIRATORY (INHALATION) at 13:24

## 2024-11-28 RX ADMIN — Medication 6: at 18:19

## 2024-11-28 RX ADMIN — Medication 6: at 12:10

## 2024-11-28 RX ADMIN — Medication 40 MILLIGRAM(S): at 05:57

## 2024-11-28 NOTE — PROGRESS NOTE ADULT - SUBJECTIVE AND OBJECTIVE BOX
SUBJECTIVE:    Patient is a 44y old Female who presents with a chief complaint of Fever, cough (27 Nov 2024 11:11)    Currently admitted to medicine with the primary diagnosis of Asthma exacerbation       Today is hospital day 1d. This morning she is resting comfortably in bed and reports no new issues or overnight events.     PAST MEDICAL & SURGICAL HISTORY  Diabetes mellitus    History of asthma      SOCIAL HISTORY:  Negative for smoking/alcohol/drug use.     ALLERGIES:  No Known Allergies    MEDICATIONS:  STANDING MEDICATIONS  albuterol/ipratropium for Nebulization 3 milliLiter(s) Nebulizer every 6 hours  dextrose 5%. 1000 milliLiter(s) IV Continuous <Continuous>  dextrose 5%. 1000 milliLiter(s) IV Continuous <Continuous>  dextrose 50% Injectable 25 Gram(s) IV Push once  dextrose 50% Injectable 12.5 Gram(s) IV Push once  dextrose 50% Injectable 25 Gram(s) IV Push once  enoxaparin Injectable 40 milliGRAM(s) SubCutaneous every 24 hours  glucagon  Injectable 1 milliGRAM(s) IntraMuscular once  influenza   Vaccine 0.5 milliLiter(s) IntraMuscular once  insulin lispro (ADMELOG) corrective regimen sliding scale   SubCutaneous three times a day before meals  methylPREDNISolone sodium succinate Injectable 40 milliGRAM(s) IV Push every 8 hours    PRN MEDICATIONS  dextrose Oral Gel 15 Gram(s) Oral once PRN    VITALS:   T(F): 98.4  HR: 120  BP: 125/87  RR: 18  SpO2: 95%    LABS:                        13.2   13.58 )-----------( 270      ( 26 Nov 2024 22:01 )             38.1     11-26    138  |  101  |  10  ----------------------------<  124[H]  3.4[L]   |  22  |  0.7    Ca    9.7      26 Nov 2024 22:01    TPro  7.2  /  Alb  4.7  /  TBili  0.9  /  DBili  x   /  AST  21  /  ALT  34  /  AlkPhos  61  11-26    PT/INR - ( 26 Nov 2024 22:01 )   PT: 14.30 sec;   INR: 1.21 ratio         PTT - ( 26 Nov 2024 22:01 )  PTT:31.6 sec  Urinalysis Basic - ( 26 Nov 2024 22:01 )    Color: x / Appearance: x / SG: x / pH: x  Gluc: 124 mg/dL / Ketone: x  / Bili: x / Urobili: x   Blood: x / Protein: x / Nitrite: x   Leuk Esterase: x / RBC: x / WBC x   Sq Epi: x / Non Sq Epi: x / Bacteria: x      ABG - ( 27 Nov 2024 12:19 )  pH, Arterial: 7.44  pH, Blood: x     /  pCO2: 32    /  pO2: 61    / HCO3: 22    / Base Excess: -1.6  /  SaO2: 92.3                  Culture - Blood (collected 26 Nov 2024 23:02)  Source: .Blood BLOOD  Preliminary Report (28 Nov 2024 07:01):    No growth at 24 hours    Culture - Blood (collected 26 Nov 2024 23:02)  Source: .Blood BLOOD  Preliminary Report (28 Nov 2024 07:01):    No growth at 24 hours          RADIOLOGY:    < from: CT Angio Chest PE Protocol w/ IV Cont (11.27.24 @ 13:36) >    IMPRESSION:  Multifocal pneumonia.    No pulmonary embolism.    < end of copied text >      PHYSICAL EXAM:  GEN: No acute distress  LUNGS: Clear to auscultation bilaterally   HEART: S1/S2 present. RRR.   ABD: Soft, non-tender, non-distended. Bowel sounds present  EXT: NC/NC/NE/2+PP/MYERS  NEURO: AAOX3    
SUBJECTIVE/OVERNIGHT EVENTS  Today is hospital day 1d. This morning patient was seen and examined at bedside, resting comfortably in bed. No acute or major events overnight. Pt denies any new complaints.    MEDICATIONS  STANDING MEDICATIONS  albuterol/ipratropium for Nebulization 3 milliLiter(s) Nebulizer every 6 hours  dextrose 5%. 1000 milliLiter(s) IV Continuous <Continuous>  dextrose 5%. 1000 milliLiter(s) IV Continuous <Continuous>  dextrose 50% Injectable 25 Gram(s) IV Push once  dextrose 50% Injectable 12.5 Gram(s) IV Push once  dextrose 50% Injectable 25 Gram(s) IV Push once  enoxaparin Injectable 40 milliGRAM(s) SubCutaneous every 24 hours  glucagon  Injectable 1 milliGRAM(s) IntraMuscular once  influenza   Vaccine 0.5 milliLiter(s) IntraMuscular once  insulin lispro (ADMELOG) corrective regimen sliding scale   SubCutaneous three times a day before meals  predniSONE   Tablet 40 milliGRAM(s) Oral daily    PRN MEDICATIONS  dextrose Oral Gel 15 Gram(s) Oral once PRN    VITALS  T(F): 98.4 (11-28-24 @ 08:15), Max: 98.4 (11-28-24 @ 08:15)  HR: 120 (11-28-24 @ 08:15) (109 - 120)  BP: 125/87 (11-28-24 @ 08:15) (113/79 - 125/87)  RR: 18 (11-28-24 @ 08:15) (18 - 18)  SpO2: 95% (11-28-24 @ 08:15) (93% - 95%)  POCT Blood Glucose.: 258 mg/dL (11-28-24 @ 11:37)  POCT Blood Glucose.: 168 mg/dL (11-28-24 @ 07:40)  POCT Blood Glucose.: 167 mg/dL (11-27-24 @ 17:25)    PHYSICAL EXAM  GENERAL: NAD, lying in bed comfortably, on 1LNC  HEAD:  Atraumatic, normocephalic  HEART: Regular rate and rhythm, no murmurs, rubs, or gallops  LUNGS: Unlabored respirations.  Clear to auscultation bilaterally, no crackles, wheezing, or rhonchi  ABDOMEN: Soft, nontender, nondistended, +BS  EXTREMITIES: No LE edema    LABS             13.2   13.58 )-----------( 270      ( 11-26-24 @ 22:01 )             38.1     138  |  101  |  10  -------------------------<  124   11-26-24 @ 22:01  3.4  |  22  |  0.7    Ca      9.7     11-26-24 @ 22:01    TPro  7.2  /  Alb  4.7  /  TBili  0.9  /  DBili  x   /  AST  21  /  ALT  34  /  AlkPhos  61  /  GGT  x     11-26-24 @ 22:01    PT/INR - ( 11-26-24 @ 22:01 )   PT: 14.30 sec[H];   INR: 1.21 ratio  PTT - ( 11-26-24 @ 22:01 )  PTT:31.6 sec    Pro-Brain Natriuretic Peptide: <36 pg/mL (11-26-24 @ 22:01)    Urinalysis Basic - ( 26 Nov 2024 22:01 )    Color: x / Appearance: x / SG: x / pH: x  Gluc: 124 mg/dL / Ketone: x  / Bili: x / Urobili: x   Blood: x / Protein: x / Nitrite: x   Leuk Esterase: x / RBC: x / WBC x   Sq Epi: x / Non Sq Epi: x / Bacteria: x      ABG - ( 27 Nov 2024 12:19 )  pH, Arterial: 7.44  pH, Blood: x     /  pCO2: 32    /  pO2: 61    / HCO3: 22    / Base Excess: -1.6  /  SaO2: 92.3          Culture - Blood (collected 26 Nov 2024 23:02)  Source: .Blood BLOOD  Preliminary Report (28 Nov 2024 07:01):    No growth at 24 hours    Culture - Blood (collected 26 Nov 2024 23:02)  Source: .Blood BLOOD  Preliminary Report (28 Nov 2024 07:01):    No growth at 24 hours

## 2024-11-28 NOTE — PROGRESS NOTE ADULT - ASSESSMENT
Acute hypoxic respiratory failure on 2L NC 2/2 Multifocal PNA  HMPV  DM    -At rest pt satting 92 on RA with good waveform, ambulation 91%  -Dimer negative  -CXR reviewed  - CT ches with multifocal PNA  -ABG with pO2 only 61, high A-a gradient 45 for age (expected 15 for age)  -procal, cultures  -ID apprecaited  -wean O2 as tolerated   - transition steroids to po regimen today   - monitor off ABX    dvt ppx: lovenox  GI ppx: PPI  Anticepate d/c home in AM  
44-year-old female PMH asthma, diabetes (metformin, Ozempic) coming to ED for shortness of breath and fever.  Patient reports x 1 week has had intermittent shortness of breath, nonexertional, with associated fever Tmax 103F.  Has associated dry cough.  Reports family also has fever and cough,  tested positive for COVID this week.  Has been taking nebulized albuterol this week with some relief.  Denies recent travel, chest pain, abdominal pain, nausea vomiting diarrhea constipation, urinary symptoms, leg pain/swelling, hormone use.    # SIRS ( fever tachy leucocytosis) on admission  # acute exacerbation of asthma vs PE vs Pna vs acute bronchitis vs acute pharynitis  - fever and cough (on/off for 1 week), mostly dry cough, cough on deep inspiration   -  younger child got sick recently, then  was covid positive yesterday   - no recent diarrhoea, travel history, jaundice, arthralgia, joint tenderness,  normal bladder and bowel habits, does not use drugs  - able to speak sentences without difficulty, pharyngeal erythematous  - ED vitals were:  BP: 123/68,  RR 18, Temp 99.5( 103 HIE), on 2L NC   - Lab: wbc 13.58, dimer negative, K 3.4 corrected, BNP 36 , lactate normal  - Chest xray: read pending  ED T/t: methylprednisone, duonebs, mg/K supplement  c/w o2 supplement , methylprednisone 40 TID, duoneb q6h standing   transition steroids to po regimen today  RVP negative, Full RVP panel sent   ID consult   F/u Bcx   wean off o2    # DM   - metformin and ozempic   - ISS, monitor BS      DVT: lovenox  Diet: CC diet  activity as tolerated  dispo: med

## 2024-11-28 NOTE — DISCHARGE NOTE NURSING/CASE MANAGEMENT/SOCIAL WORK - NSDCPEFALRISK_GEN_ALL_CORE
For information on Fall & Injury Prevention, visit: [email or web address] OR  [email or web address] OR  https://www.cdc.gov/steadi/patient.html

## 2024-11-28 NOTE — PROGRESS NOTE ADULT - REASON FOR ADMISSION
Fever, cough
Notify patient that Dr Servando Yeboah reviewed lab work and only significant abnormality was A1c of 9 3% and high blood sugar of 234  Glucose was similar to last year and is indicative of uncontrolled diabetes  CBC, lipid panel, NT BNP, TSH, and CMP were otherwise normal   He should have Dr Shahriar Taylor or whoever handles his diabetes mellitus review the diabetes situation with him  We will see him as previously indicated in my last note in about one year 
Fever, cough

## 2024-11-28 NOTE — DISCHARGE NOTE NURSING/CASE MANAGEMENT/SOCIAL WORK - FINANCIAL ASSISTANCE
[facility] provides services at a reduced cost to those who are determined to be eligible through [facility]’s financial assistance program. Information regarding [facility]’s financial assistance program can be found by going to [email or web address] or by calling [phone].

## 2024-11-28 NOTE — DISCHARGE NOTE NURSING/CASE MANAGEMENT/SOCIAL WORK - PATIENT PORTAL LINK FT
You can access the FollowMyHealth Patient Portal offered by [facility] by registering at the following website: [email or web address]. By joining [employer]’s FollowMyHealth portal, you will also be able to view your health information using other applications (apps) compatible with our system.

## 2024-11-29 NOTE — CHART NOTE - NSCHARTNOTEFT_GEN_A_CORE
Pt was discharged against medical advice yesterday, 11/29/2024. The need for further medical evaluation and care given the patient's current medical condition was discussed with the patient, and the patient refused further medical evaluation and treatment. AMA paperwork was signed. Discharge paperwork was given to the patient. AMA was documented in "Hospital Course" but not in "Care Plan/Procedures."

## 2024-12-02 LAB
CULTURE RESULTS: SIGNIFICANT CHANGE UP
CULTURE RESULTS: SIGNIFICANT CHANGE UP
SPECIMEN SOURCE: SIGNIFICANT CHANGE UP
SPECIMEN SOURCE: SIGNIFICANT CHANGE UP

## 2024-12-04 DIAGNOSIS — D84.9 IMMUNODEFICIENCY, UNSPECIFIED: ICD-10-CM

## 2024-12-04 DIAGNOSIS — J96.01 ACUTE RESPIRATORY FAILURE WITH HYPOXIA: ICD-10-CM

## 2024-12-04 DIAGNOSIS — Z79.85 LONG-TERM (CURRENT) USE OF INJECTABLE NON-INSULIN ANTIDIABETIC DRUGS: ICD-10-CM

## 2024-12-04 DIAGNOSIS — Z11.52 ENCOUNTER FOR SCREENING FOR COVID-19: ICD-10-CM

## 2024-12-04 DIAGNOSIS — J45.901 UNSPECIFIED ASTHMA WITH (ACUTE) EXACERBATION: ICD-10-CM

## 2024-12-04 DIAGNOSIS — J18.9 PNEUMONIA, UNSPECIFIED ORGANISM: ICD-10-CM

## 2024-12-04 DIAGNOSIS — Z79.84 LONG TERM (CURRENT) USE OF ORAL HYPOGLYCEMIC DRUGS: ICD-10-CM

## 2024-12-04 DIAGNOSIS — Z53.29 PROCEDURE AND TREATMENT NOT CARRIED OUT BECAUSE OF PATIENT'S DECISION FOR OTHER REASONS: ICD-10-CM

## 2024-12-04 DIAGNOSIS — E11.9 TYPE 2 DIABETES MELLITUS WITHOUT COMPLICATIONS: ICD-10-CM
